# Patient Record
Sex: FEMALE | Race: WHITE | Employment: FULL TIME | URBAN - METROPOLITAN AREA
[De-identification: names, ages, dates, MRNs, and addresses within clinical notes are randomized per-mention and may not be internally consistent; named-entity substitution may affect disease eponyms.]

---

## 2017-03-03 ENCOUNTER — HOSPITAL ENCOUNTER (OUTPATIENT)
Dept: CT IMAGING | Age: 62
Discharge: HOME | End: 2017-03-03
Attending: INTERNAL MEDICINE

## 2017-03-03 VITALS — DIASTOLIC BLOOD PRESSURE: 68 MMHG | SYSTOLIC BLOOD PRESSURE: 107 MMHG | HEART RATE: 69 BPM

## 2017-03-03 DIAGNOSIS — Z13.220 SCREENING CHOLESTEROL LEVEL: ICD-10-CM

## 2017-03-03 NOTE — IMAGING NOTE
Cholesterol/Glucose screening not performed. Mrs. Severiano Rodriguez reported lipid panel 10-25-16. Results discussed. Mrs. Severiano Rodriguez verbalized understanding. Preliminary results for CT Calcium Score provided: 0.00. Risks and findings discussed with Mrs. HAMILTON

## 2017-03-11 ENCOUNTER — TELEPHONE (OUTPATIENT)
Dept: INTERNAL MEDICINE CLINIC | Facility: CLINIC | Age: 62
End: 2017-03-11

## 2017-03-13 NOTE — TELEPHONE ENCOUNTER
's message that her CT heart calcium score was normal relayed on patients home answering machine (per HIP[employer]).

## 2017-10-31 RX ORDER — VALACYCLOVIR HYDROCHLORIDE 1 G/1
TABLET, FILM COATED ORAL
Qty: 20 TABLET | Refills: 0 | Status: SHIPPED | OUTPATIENT
Start: 2017-10-31 | End: 2019-04-03

## 2017-10-31 NOTE — TELEPHONE ENCOUNTER
Patient asking for refill for:    Valtrex    Send to [facility] in [place] on [address]

## 2017-12-20 ENCOUNTER — OFFICE VISIT (OUTPATIENT)
Dept: INTERNAL MEDICINE CLINIC | Facility: CLINIC | Age: 62
End: 2017-12-20

## 2017-12-20 VITALS
DIASTOLIC BLOOD PRESSURE: 76 MMHG | HEIGHT: 64 IN | SYSTOLIC BLOOD PRESSURE: 112 MMHG | TEMPERATURE: 99 F | HEART RATE: 68 BPM | WEIGHT: 141 LBS | BODY MASS INDEX: 24.07 KG/M2

## 2017-12-20 DIAGNOSIS — E55.9 VITAMIN D DEFICIENCY: ICD-10-CM

## 2017-12-20 DIAGNOSIS — R53.83 OTHER FATIGUE: ICD-10-CM

## 2017-12-20 DIAGNOSIS — Z11.59 NEED FOR HEPATITIS C SCREENING TEST: ICD-10-CM

## 2017-12-20 DIAGNOSIS — R73.9 HYPERGLYCEMIA: ICD-10-CM

## 2017-12-20 DIAGNOSIS — M85.80 OSTEOPENIA, UNSPECIFIED LOCATION: ICD-10-CM

## 2017-12-20 DIAGNOSIS — Z00.00 ROUTINE HEALTH MAINTENANCE: ICD-10-CM

## 2017-12-20 DIAGNOSIS — Z12.31 ENCOUNTER FOR SCREENING MAMMOGRAM FOR BREAST CANCER: ICD-10-CM

## 2017-12-20 DIAGNOSIS — E78.00 HYPERCHOLESTEROLEMIA: Primary | ICD-10-CM

## 2017-12-20 DIAGNOSIS — Z12.4 SCREENING FOR MALIGNANT NEOPLASM OF CERVIX: ICD-10-CM

## 2017-12-20 DIAGNOSIS — R73.03 PREDIABETES: ICD-10-CM

## 2017-12-20 PROCEDURE — 99396 PREV VISIT EST AGE 40-64: CPT | Performed by: INTERNAL MEDICINE

## 2017-12-20 NOTE — PROGRESS NOTES
Karlo Li is a 62year old female. Patient presents with:  Physical: Patient is here today for a PE. She is due for pap exam today. No issues at this time. She has been feeling good lately.        HPI:   Karlo Li is a 62year old f congestion, sinus pain or ST  LUNGS: denies shortness of breath with exertion or cough  CARDIOVASCULAR: denies chest pain, pressure, or palpitations  GI: denies abdominal pain, nausea, vomiting, diarrhea, constipation, hematochezia, or melena  NEURO: denie

## 2018-01-05 LAB
ABSOLUTE BASOPHILS: 20 CELLS/UL (ref 0–200)
ABSOLUTE EOSINOPHILS: 160 CELLS/UL (ref 15–500)
ABSOLUTE LYMPHOCYTES: 1340 CELLS/UL (ref 850–3900)
ABSOLUTE MONOCYTES: 357 CELLS/UL (ref 200–950)
ABSOLUTE NEUTROPHILS: 1523 CELLS/UL (ref 1500–7800)
ALBUMIN/GLOBULIN RATIO: 1.9 (CALC) (ref 1–2.5)
ALBUMIN: 4.3 G/DL (ref 3.6–5.1)
ALKALINE PHOSPHATASE: 60 U/L (ref 33–130)
ALT: 14 U/L (ref 6–29)
AST: 16 U/L (ref 10–35)
BASOPHILS: 0.6 %
BILIRUBIN, TOTAL: 0.7 MG/DL (ref 0.2–1.2)
BUN/CREATININE RATIO: 35 (CALC) (ref 6–22)
BUN: 26 MG/DL (ref 7–25)
CALCIUM: 9.3 MG/DL (ref 8.6–10.4)
CARBON DIOXIDE: 27 MMOL/L (ref 20–31)
CHLORIDE: 105 MMOL/L (ref 98–110)
CHOL/HDLC RATIO: 2.2 (CALC)
CHOLESTEROL, TOTAL: 241 MG/DL
CREATININE, RANDOM URINE: 147 MG/DL (ref 20–320)
CREATININE: 0.75 MG/DL (ref 0.5–0.99)
EGFR IF AFRICN AM: 100 ML/MIN/1.73M2
EGFR IF NONAFRICN AM: 86 ML/MIN/1.73M2
EOSINOPHILS: 4.7 %
GLOBULIN: 2.3 G/DL (CALC) (ref 1.9–3.7)
GLUCOSE: 99 MG/DL (ref 65–99)
HDL CHOLESTEROL: 108 MG/DL
HEMATOCRIT: 40.8 % (ref 35–45)
HEMOGLOBIN A1C: 5.6 % OF TOTAL HGB
HEMOGLOBIN: 14 G/DL (ref 11.7–15.5)
LDL-CHOLESTEROL: 118 MG/DL (CALC)
LYMPHOCYTES: 39.4 %
MCH: 30.8 PG (ref 27–33)
MCHC: 34.3 G/DL (ref 32–36)
MCV: 89.7 FL (ref 80–100)
MICROALBUMIN/CREATININE RATIO, RANDOM URINE: 3 MCG/MG CREAT
MICROALBUMIN: 0.5 MG/DL
MONOCYTES: 10.5 %
MPV: 11.1 FL (ref 7.5–12.5)
NEUTROPHILS: 44.8 %
NON-HDL CHOLESTEROL: 133 MG/DL (CALC)
PLATELET COUNT: 197 THOUSAND/UL (ref 140–400)
POTASSIUM: 4.3 MMOL/L (ref 3.5–5.3)
PROTEIN, TOTAL: 6.6 G/DL (ref 6.1–8.1)
RDW: 13.1 % (ref 11–15)
RED BLOOD CELL COUNT: 4.55 MILLION/UL (ref 3.8–5.1)
SIGNAL TO CUT-OFF: 0.02
SODIUM: 139 MMOL/L (ref 135–146)
TRIGLYCERIDES: 49 MG/DL
TSH: 2.6 MIU/L (ref 0.4–4.5)
VITAMIN D, 25-OH, TOTAL: 29 NG/ML (ref 30–100)
WHITE BLOOD CELL COUNT: 3.4 THOUSAND/UL (ref 3.8–10.8)

## 2018-01-16 ENCOUNTER — HOSPITAL ENCOUNTER (OUTPATIENT)
Dept: MAMMOGRAPHY | Age: 63
Discharge: HOME | End: 2018-01-16
Attending: INTERNAL MEDICINE
Payer: COMMERCIAL

## 2018-01-16 ENCOUNTER — HOSPITAL ENCOUNTER (OUTPATIENT)
Dept: BONE DENSITY | Age: 63
Discharge: HOME | End: 2018-01-16
Attending: INTERNAL MEDICINE
Payer: COMMERCIAL

## 2018-01-16 DIAGNOSIS — M85.80 OSTEOPENIA, UNSPECIFIED LOCATION: ICD-10-CM

## 2018-01-16 DIAGNOSIS — Z12.31 ENCOUNTER FOR SCREENING MAMMOGRAM FOR BREAST CANCER: ICD-10-CM

## 2018-01-16 PROCEDURE — 77067 SCR MAMMO BI INCL CAD: CPT | Performed by: INTERNAL MEDICINE

## 2018-01-16 PROCEDURE — 77080 DXA BONE DENSITY AXIAL: CPT | Performed by: INTERNAL MEDICINE

## 2018-01-18 ENCOUNTER — PATIENT MESSAGE (OUTPATIENT)
Dept: INTERNAL MEDICINE CLINIC | Facility: CLINIC | Age: 63
End: 2018-01-18

## 2018-02-06 ENCOUNTER — TELEPHONE (OUTPATIENT)
Dept: INTERNAL MEDICINE CLINIC | Facility: CLINIC | Age: 63
End: 2018-02-06

## 2018-02-06 NOTE — TELEPHONE ENCOUNTER
Chart reviewed, faxed request to [facility] to resubmit pap with Z12.4 as primary diagnosis. Fax [phone].

## 2018-02-06 NOTE — TELEPHONE ENCOUNTER
Patient received statement from [facility].  Perfecto Stains her insurance regarding the balance due. Insurance is stating pap was filed with incorrect diagnosis.

## 2019-03-07 ENCOUNTER — TELEPHONE (OUTPATIENT)
Dept: INTERNAL MEDICINE CLINIC | Facility: CLINIC | Age: 64
End: 2019-03-07

## 2019-03-07 DIAGNOSIS — R73.9 HYPERGLYCEMIA: ICD-10-CM

## 2019-03-07 DIAGNOSIS — E55.9 VITAMIN D DEFICIENCY: ICD-10-CM

## 2019-03-07 DIAGNOSIS — Z00.00 ANNUAL PHYSICAL EXAM: Primary | ICD-10-CM

## 2019-03-07 DIAGNOSIS — E78.00 HYPERCHOLESTEREMIA: ICD-10-CM

## 2019-03-07 DIAGNOSIS — Z12.39 SCREENING FOR BREAST CANCER: ICD-10-CM

## 2019-03-27 NOTE — TELEPHONE ENCOUNTER
Pt. Is calling back she needs all her lab orders sent to [facility] labs in [facility] on [address].   Minna's ph. # [phone]   Routed to clinical

## 2019-03-27 NOTE — TELEPHONE ENCOUNTER
Switched Dr. Faye Blunt labs orders to [employer] and notified patient of this action. Faxed to. ..     [employer]  Address: [address] [place] [address]   Phone: [phone]  Fax: [phone]

## 2019-03-30 ENCOUNTER — HOSPITAL ENCOUNTER (OUTPATIENT)
Dept: MAMMOGRAPHY | Age: 64
Discharge: HOME | End: 2019-03-30
Attending: INTERNAL MEDICINE
Payer: COMMERCIAL

## 2019-03-30 DIAGNOSIS — Z12.39 SCREENING FOR BREAST CANCER: ICD-10-CM

## 2019-03-30 PROCEDURE — 77067 SCR MAMMO BI INCL CAD: CPT | Performed by: INTERNAL MEDICINE

## 2019-03-30 PROCEDURE — 77063 BREAST TOMOSYNTHESIS BI: CPT | Performed by: INTERNAL MEDICINE

## 2019-03-31 LAB
ABSOLUTE BASOPHILS: 19 CELLS/UL (ref 0–200)
ABSOLUTE EOSINOPHILS: 163 CELLS/UL (ref 15–500)
ABSOLUTE LYMPHOCYTES: 1399 CELLS/UL (ref 850–3900)
ABSOLUTE MONOCYTES: 448 CELLS/UL (ref 200–950)
ABSOLUTE NEUTROPHILS: 1672 CELLS/UL (ref 1500–7800)
ALBUMIN/GLOBULIN RATIO: 1.7 (CALC) (ref 1–2.5)
ALBUMIN: 4.4 G/DL (ref 3.6–5.1)
ALKALINE PHOSPHATASE: 62 U/L (ref 33–130)
ALT: 14 U/L (ref 6–29)
AST: 18 U/L (ref 10–35)
BASOPHILS: 0.5 %
BILIRUBIN, TOTAL: 0.7 MG/DL (ref 0.2–1.2)
BUN: 24 MG/DL (ref 7–25)
CALCIUM: 9.8 MG/DL (ref 8.6–10.4)
CARBON DIOXIDE: 25 MMOL/L (ref 20–32)
CHLORIDE: 106 MMOL/L (ref 98–110)
CHOL/HDLC RATIO: 2.4 (CALC)
CHOLESTEROL, TOTAL: 216 MG/DL
CREATININE: 0.67 MG/DL (ref 0.5–0.99)
EGFR IF AFRICN AM: 109 ML/MIN/1.73M2
EGFR IF NONAFRICN AM: 94 ML/MIN/1.73M2
EOSINOPHILS: 4.4 %
GLOBULIN: 2.6 G/DL (CALC) (ref 1.9–3.7)
GLUCOSE: 101 MG/DL (ref 65–99)
HDL CHOLESTEROL: 89 MG/DL
HEMATOCRIT: 41.2 % (ref 35–45)
HEMOGLOBIN A1C: 5.5 % OF TOTAL HGB
HEMOGLOBIN: 13.8 G/DL (ref 11.7–15.5)
LDL-CHOLESTEROL: 113 MG/DL (CALC)
LYMPHOCYTES: 37.8 %
MCH: 29.9 PG (ref 27–33)
MCHC: 33.5 G/DL (ref 32–36)
MCV: 89.2 FL (ref 80–100)
MONOCYTES: 12.1 %
MPV: 11.7 FL (ref 7.5–12.5)
NEUTROPHILS: 45.2 %
NON-HDL CHOLESTEROL: 127 MG/DL (CALC)
PLATELET COUNT: 197 THOUSAND/UL (ref 140–400)
POTASSIUM: 4.3 MMOL/L (ref 3.5–5.3)
PROTEIN, TOTAL: 7 G/DL (ref 6.1–8.1)
RDW: 12.4 % (ref 11–15)
RED BLOOD CELL COUNT: 4.62 MILLION/UL (ref 3.8–5.1)
SODIUM: 140 MMOL/L (ref 135–146)
TRIGLYCERIDES: 46 MG/DL
TSH: 1.56 MIU/L (ref 0.4–4.5)
WHITE BLOOD CELL COUNT: 3.7 THOUSAND/UL (ref 3.8–10.8)

## 2019-04-03 ENCOUNTER — OFFICE VISIT (OUTPATIENT)
Dept: INTERNAL MEDICINE CLINIC | Facility: CLINIC | Age: 64
End: 2019-04-03
Payer: COMMERCIAL

## 2019-04-03 VITALS
SYSTOLIC BLOOD PRESSURE: 116 MMHG | OXYGEN SATURATION: 97 % | BODY MASS INDEX: 25.95 KG/M2 | HEART RATE: 87 BPM | DIASTOLIC BLOOD PRESSURE: 70 MMHG | RESPIRATION RATE: 12 BRPM | WEIGHT: 152 LBS | TEMPERATURE: 98 F | HEIGHT: 64 IN

## 2019-04-03 DIAGNOSIS — R20.2 PARESTHESIA OF BOTH FEET: Primary | ICD-10-CM

## 2019-04-03 DIAGNOSIS — R73.03 PREDIABETES: ICD-10-CM

## 2019-04-03 DIAGNOSIS — E78.00 HYPERCHOLESTEROLEMIA: ICD-10-CM

## 2019-04-03 DIAGNOSIS — M85.80 OSTEOPENIA, UNSPECIFIED LOCATION: ICD-10-CM

## 2019-04-03 DIAGNOSIS — Z00.00 ROUTINE HEALTH MAINTENANCE: ICD-10-CM

## 2019-04-03 PROCEDURE — 99396 PREV VISIT EST AGE 40-64: CPT | Performed by: INTERNAL MEDICINE

## 2019-04-03 PROCEDURE — 90715 TDAP VACCINE 7 YRS/> IM: CPT | Performed by: INTERNAL MEDICINE

## 2019-04-03 PROCEDURE — 90471 IMMUNIZATION ADMIN: CPT | Performed by: INTERNAL MEDICINE

## 2019-04-03 RX ORDER — VALACYCLOVIR HYDROCHLORIDE 1 G/1
TABLET, FILM COATED ORAL
Qty: 20 TABLET | Refills: 1 | Status: SHIPPED | OUTPATIENT
Start: 2019-04-03 | End: 2021-10-15

## 2019-04-03 NOTE — PROGRESS NOTES
Francesca Garcia is a 59year old female. Patient presents with: Annual: Annual physical.   Medication Request: Pt requesting refill. Medication pended for MD review.       HPI:   Francesca Garcia is a 59year old female who presents for a compl skin lesions  EYES:denies blurred vision or double vision  HEENT: denies nasal congestion, sinus pain or ST  LUNGS: denies shortness of breath with exertion or cough  CARDIOVASCULAR: denies chest pain, pressure, or palpitations  GI: denies abdominal pain,

## 2019-04-10 ENCOUNTER — TELEPHONE (OUTPATIENT)
Dept: INTERNAL MEDICINE CLINIC | Facility: CLINIC | Age: 64
End: 2019-04-10

## 2019-04-10 NOTE — TELEPHONE ENCOUNTER
Vitamin B12 level is normal -- does not explain the tingling in her toes.   If really bothersome or getting worse, we could do an EMG to further explore the cause of her symptoms

## 2019-04-11 NOTE — TELEPHONE ENCOUNTER
Dr. Goodman Ask' message relayed to pt who verbalized understanding. Pt states she will think about EMG.

## 2019-04-14 ENCOUNTER — PATIENT MESSAGE (OUTPATIENT)
Dept: INTERNAL MEDICINE CLINIC | Facility: CLINIC | Age: 64
End: 2019-04-14

## 2019-04-22 ENCOUNTER — PATIENT MESSAGE (OUTPATIENT)
Dept: INTERNAL MEDICINE CLINIC | Facility: CLINIC | Age: 64
End: 2019-04-22

## 2019-04-23 NOTE — TELEPHONE ENCOUNTER
From: Sheryl Asif  To: Gigi Brennan MD  Sent: 4/22/2019 11:25 PM CDT  Subject: Referral Request    I have not yet received a response.  Does Dr Anca Gracia still recommend an EMG??

## 2019-04-23 NOTE — TELEPHONE ENCOUNTER
To Dr. Solange Torres----    Pt sent message on 4/14/19 regarding an order for an EMG. Please see message below: \"Dr Solange Torres suggested an EMG. Could you have an order placed for that? And do I just go through hospital to schedule it? \"     Please advise.

## 2019-04-30 ENCOUNTER — TELEPHONE (OUTPATIENT)
Dept: INTERNAL MEDICINE CLINIC | Facility: CLINIC | Age: 64
End: 2019-04-30

## 2019-04-30 DIAGNOSIS — R20.2 PARESTHESIA: Primary | ICD-10-CM

## 2019-04-30 NOTE — TELEPHONE ENCOUNTER
Following up on 4/14 & 4/22 My Chart messages  Does Dr Christine Malhotra still want pt to have EMG  If so - pt wants to get that done    Requests call back [phone]

## 2019-04-30 NOTE — TELEPHONE ENCOUNTER
Dr. Maximino Hall verbally states to order the EMG for patient. Dx paresthesia. Order placed per Dr. Maximino Hall' verbal requests. Spoke with patient and notified her that order was placed. Number to schedule given to patient.

## 2019-06-06 ENCOUNTER — HOSPITAL ENCOUNTER (OUTPATIENT)
Dept: ELECTROPHYSIOLOGY | Facility: HOSPITAL | Age: 64
Discharge: HOME | End: 2019-06-06
Attending: INTERNAL MEDICINE
Payer: COMMERCIAL

## 2019-06-06 DIAGNOSIS — R20.2 PARESTHESIA: ICD-10-CM

## 2019-06-06 PROCEDURE — 95911 NRV CNDJ TEST 9-10 STUDIES: CPT

## 2019-06-06 PROCEDURE — 95885 MUSC TST DONE W/NERV TST LIM: CPT

## 2019-06-12 ENCOUNTER — TELEPHONE (OUTPATIENT)
Dept: INTERNAL MEDICINE CLINIC | Facility: CLINIC | Age: 64
End: 2019-06-12

## 2019-06-12 NOTE — TELEPHONE ENCOUNTER
To: CORY ABREU EMA CLINICAL STAFF      From: Aly Frazier      Created: 6/12/2019 8:35 AM        *-*-*This message has not been handled. *-*-*    I had an EMG performed last week and I’m just wondering what the results/follow up involves

## 2019-06-12 NOTE — TELEPHONE ENCOUNTER
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 Pt states that sx have been present for several years although have moved up her lower legs somewhat.   Discussed that her B12 and glucose are normal.  Re

## 2019-07-07 NOTE — TELEPHONE ENCOUNTER
From: Zaheer Willis  To: Juan Carlos Warren MD  Sent: 4/14/2019 11:18 AM CDT  Subject: Visit Follow-up Question    Dr Susan Owen suggested an EMG. Could you have an order placed for that? And do I just go through hospital to schedule it?

## 2019-09-04 ENCOUNTER — OFFICE VISIT (OUTPATIENT)
Dept: NEUROLOGY | Facility: CLINIC | Age: 64
End: 2019-09-04
Payer: COMMERCIAL

## 2019-09-04 VITALS
BODY MASS INDEX: 24.75 KG/M2 | DIASTOLIC BLOOD PRESSURE: 80 MMHG | SYSTOLIC BLOOD PRESSURE: 150 MMHG | RESPIRATION RATE: 16 BRPM | WEIGHT: 145 LBS | HEART RATE: 80 BPM | HEIGHT: 64 IN

## 2019-09-04 DIAGNOSIS — R20.0 NUMBNESS AND TINGLING OF BOTH LOWER EXTREMITIES: ICD-10-CM

## 2019-09-04 DIAGNOSIS — R20.2 NUMBNESS AND TINGLING OF BOTH LOWER EXTREMITIES: ICD-10-CM

## 2019-09-04 DIAGNOSIS — G62.9 POLYNEUROPATHY: Primary | ICD-10-CM

## 2019-09-04 PROCEDURE — 99244 OFF/OP CNSLTJ NEW/EST MOD 40: CPT | Performed by: OTHER

## 2019-09-04 NOTE — PATIENT INSTRUCTIONS
What is Peripheral Neuropathy? Peripheral neuropathy symptoms often start in the toes and move up the foot. Peripheral neuropathy is a disease of the nerves. It most often starts in your feet and may also eventually affect the arms.  It can affect s Diagnosis of peripheral neuropathy includes a complete history and physical exam.  Tests include blood tests and imaging often help find the cause.  Special nerve tests are often helpful including nerve conduction velocity studies (NCV), and electromyograph · Changes in vision (vision impairment)  · Changes in thinking skills and memory (cognitive impairment)  Injuries from a fall can include broken bones, dislocated joints, internal bleeding and cuts.  When these injuries are serious enough, they can make it © 6934-1274 [employer] [address]. [address] [facility], [facility] [place]. All rights reserved. This information is not intended as a substitute for professional medical care. Always follow your healthcare professional's instructions.

## 2019-09-05 NOTE — PROGRESS NOTES
Neurology Outpatient Consult Note    Tamica Vo : 1955   Referring Physician: Dr. Jerica Fox  HPI:     Tamica Vo is a 59year old female who is being seen in neurologic evaluation.     Patient being seen in evaluation Years: 3.00        Pack years: .3        Quit date: 6/15/1985        Years since quittin.2      Smokeless tobacco: Former User    Substance and Sexual Activity      Alcohol use:  Yes        Alcohol/week: 0.8 standard drinks        Types: 1 Glasses of w radiculopathy. ASSESSMENT AND PLAN:   Assessment   1. Polyneuropathy  2. Numbness and tingling of both lower extremities  Presentation most consistent with length dependent polyneuropathy of unclear etiology.   Hyperreflexia however somewhat atypical, a

## 2019-09-11 ENCOUNTER — PATIENT MESSAGE (OUTPATIENT)
Dept: NEUROLOGY | Facility: CLINIC | Age: 64
End: 2019-09-11

## 2019-09-13 NOTE — TELEPHONE ENCOUNTER
From: Celina Gambino MD  To: Valentin Ayala  Sent: 9/11/2019 11:42 PM CDT  Subject: blood work results from 9/4    Dear Margorie Essex,    I hope you are well.  I wanted to let you know that your blood work results from 9/4 for reversible causes of neurop

## 2019-09-16 ENCOUNTER — PATIENT MESSAGE (OUTPATIENT)
Dept: NEUROLOGY | Facility: CLINIC | Age: 64
End: 2019-09-16

## 2019-09-16 DIAGNOSIS — R20.2 NUMBNESS AND TINGLING OF BOTH LOWER EXTREMITIES: Primary | ICD-10-CM

## 2019-09-16 DIAGNOSIS — R20.0 NUMBNESS AND TINGLING OF BOTH LOWER EXTREMITIES: Primary | ICD-10-CM

## 2019-09-16 NOTE — PROGRESS NOTES
Replied to patient's mychart message. Order placed for [facility] with contact info provided.

## 2019-09-16 NOTE — TELEPHONE ENCOUNTER
From: Fuad Angel  To: Elizabeth Kaye MD  Sent: 9/16/2019 11:06 AM CDT  Subject: Visit Follow-up Question    Thank you Dr Faustino Flaherty for addressing my concerns. I really appreciate it.  I think I would like to go forward with the [facility] neuromuscular

## 2019-09-18 LAB
ACETYLCHOLINE RECEPTOR GANGLIONIC (ALPHA 3) AB: <53 PMOL/L
ACETYLCHOLINE RECEPTOR$BINDING ANTIBODY: <0.3 NMOL/L
AGNA/SOX1 AB, IFA: NEGATIVE
ALBUMIN: 4.3 G/DL (ref 3.8–4.8)
ALPHA-1-GLOBULINS: 0.3 G/DL (ref 0.2–0.3)
ALPHA-2-GLOBULINS: 0.8 G/DL (ref 0.5–0.9)
AMPHIPHYSIN AB, IFA: NEGATIVE
ANNA1 (HU) AB, IFA: NEGATIVE
ANNA2 (RI) AB, IFA: NEGATIVE
ANNA3 AB, IFA: NEGATIVE
BETA 1 GLOBULIN: 0.4 G/DL (ref 0.4–0.6)
BETA 2 GLOBULIN: 0.3 G/DL (ref 0.2–0.5)
CRMP5/CV2 AB, IFA: NEGATIVE
GAMMA GLOBULINS: 0.9 G/DL (ref 0.8–1.7)
LYME AB SCREEN: <0.9 INDEX
PCA TR (DNER) AB, IFA: NEGATIVE
PCA1 (YO) AB, IFA: NEGATIVE
PCA2 AB, IFA: NEGATIVE
PROTEIN, TOTAL: 6.9 G/DL (ref 6.1–8.1)
STRIATIONAL (STRIATED MSCL) AB: NEGATIVE
VOLTAGE GATED CALCIUM CHANNEL (VGCC) AB ASSAY: <30 PMOL/L
VOLTAGE GATED CALCIUM CHANNEL (VGCC)TYPE N AB: <54 PMOL/L
VOLTAGE GATED POTASSIUM CHANNEL (VGKC) AB: <80 PMOL/L

## 2019-11-09 ENCOUNTER — HOSPITAL ENCOUNTER (OUTPATIENT)
Dept: MRI IMAGING | Facility: HOSPITAL | Age: 64
Discharge: HOME | End: 2019-11-09
Payer: COMMERCIAL

## 2019-11-09 DIAGNOSIS — G62.9 POLYNEUROPATHY: ICD-10-CM

## 2019-11-09 PROCEDURE — A9575 INJ GADOTERATE MEGLUMI 0.1ML: HCPCS | Performed by: RADIOLOGY

## 2019-11-09 PROCEDURE — 82565 ASSAY OF CREATININE: CPT

## 2019-11-09 PROCEDURE — 72156 MRI NECK SPINE W/O & W/DYE: CPT | Performed by: OTHER

## 2020-07-21 DIAGNOSIS — Z78.9 PARTICIPANT IN HEALTH AND WELLNESS PLAN: Primary | ICD-10-CM

## 2020-08-10 ENCOUNTER — NURSE ONLY (OUTPATIENT)
Dept: LAB | Age: 65
End: 2020-08-10
Attending: PREVENTIVE MEDICINE

## 2020-08-10 DIAGNOSIS — Z78.9 PARTICIPANT IN HEALTH AND WELLNESS PLAN: ICD-10-CM

## 2020-08-10 DIAGNOSIS — R20.0 NUMBNESS AND TINGLING OF BOTH LOWER EXTREMITIES: ICD-10-CM

## 2020-08-10 DIAGNOSIS — R20.2 NUMBNESS AND TINGLING OF BOTH LOWER EXTREMITIES: ICD-10-CM

## 2020-08-10 DIAGNOSIS — G62.9 POLYNEUROPATHY: ICD-10-CM

## 2020-08-10 LAB — SARS-COV-2 IGG SERPLBLD QL IA.RAPID: NEGATIVE

## 2020-08-10 PROCEDURE — 86769 SARS-COV-2 COVID-19 ANTIBODY: CPT

## 2020-09-16 ENCOUNTER — PATIENT MESSAGE (OUTPATIENT)
Dept: INTERNAL MEDICINE CLINIC | Facility: CLINIC | Age: 65
End: 2020-09-16

## 2020-09-16 DIAGNOSIS — Z12.31 BREAST CANCER SCREENING BY MAMMOGRAM: Primary | ICD-10-CM

## 2020-09-17 NOTE — TELEPHONE ENCOUNTER
From: Karlo Li  To: Elicia Hill MD  Sent: 9/16/2020 9:57 PM CDT  Subject: Non-Urgent Medical Question    I think I am past due for Mammogram. (COVID) . Would you be able to put in an order for me? ?  Thank you

## 2020-10-07 ENCOUNTER — HOSPITAL ENCOUNTER (OUTPATIENT)
Dept: MAMMOGRAPHY | Age: 65
Discharge: HOME | End: 2020-10-07
Attending: INTERNAL MEDICINE
Payer: COMMERCIAL

## 2020-10-07 DIAGNOSIS — Z12.31 BREAST CANCER SCREENING BY MAMMOGRAM: ICD-10-CM

## 2020-10-07 PROCEDURE — 77063 BREAST TOMOSYNTHESIS BI: CPT | Performed by: INTERNAL MEDICINE

## 2020-10-07 PROCEDURE — 77067 SCR MAMMO BI INCL CAD: CPT | Performed by: INTERNAL MEDICINE

## 2020-10-22 ENCOUNTER — PATIENT MESSAGE (OUTPATIENT)
Dept: INTERNAL MEDICINE CLINIC | Facility: CLINIC | Age: 65
End: 2020-10-22

## 2020-10-22 NOTE — TELEPHONE ENCOUNTER
From: Zaheer Willis  To: Best Duarte MD  Sent: 10/22/2020 10:44 AM CDT  Subject: Other    Hi Dr Susan Owen, How are you? I missed my yearly due to [place] and all but now I think I need to be seen .  I've been experiencing more frequent chest pr

## 2020-10-23 ENCOUNTER — OFFICE VISIT (OUTPATIENT)
Dept: INTERNAL MEDICINE CLINIC | Facility: CLINIC | Age: 65
End: 2020-10-23
Payer: COMMERCIAL

## 2020-10-23 VITALS
DIASTOLIC BLOOD PRESSURE: 82 MMHG | SYSTOLIC BLOOD PRESSURE: 122 MMHG | WEIGHT: 146 LBS | OXYGEN SATURATION: 98 % | BODY MASS INDEX: 24.92 KG/M2 | HEIGHT: 64 IN | HEART RATE: 76 BPM

## 2020-10-23 DIAGNOSIS — G62.9 PERIPHERAL POLYNEUROPATHY: ICD-10-CM

## 2020-10-23 DIAGNOSIS — E78.00 HYPERCHOLESTEREMIA: ICD-10-CM

## 2020-10-23 DIAGNOSIS — R07.9 CHEST PAIN, UNSPECIFIED TYPE: Primary | ICD-10-CM

## 2020-10-23 DIAGNOSIS — M48.02 CERVICAL SPINAL STENOSIS: ICD-10-CM

## 2020-10-23 PROCEDURE — 3074F SYST BP LT 130 MM HG: CPT | Performed by: INTERNAL MEDICINE

## 2020-10-23 PROCEDURE — 93000 ELECTROCARDIOGRAM COMPLETE: CPT | Performed by: INTERNAL MEDICINE

## 2020-10-23 PROCEDURE — 3008F BODY MASS INDEX DOCD: CPT | Performed by: INTERNAL MEDICINE

## 2020-10-23 PROCEDURE — 99214 OFFICE O/P EST MOD 30 MIN: CPT | Performed by: INTERNAL MEDICINE

## 2020-10-23 PROCEDURE — 3079F DIAST BP 80-89 MM HG: CPT | Performed by: INTERNAL MEDICINE

## 2020-10-23 RX ORDER — PANTOPRAZOLE SODIUM 40 MG/1
40 TABLET, DELAYED RELEASE ORAL
Qty: 30 TABLET | Refills: 3 | Status: SHIPPED | OUTPATIENT
Start: 2020-10-23 | End: 2021-10-13

## 2020-10-23 RX ORDER — MULTIVITAMIN
1 TABLET ORAL DAILY
COMMUNITY

## 2020-10-23 NOTE — TELEPHONE ENCOUNTER
Pt. Called back. Relayed Haydee De La Cruz. Message.   Pt. Scheduled today at 3:30 with Dr. Katie Bowman. CARMINE

## 2020-10-23 NOTE — TELEPHONE ENCOUNTER
Just saw this now -- this should have been flagged. Pt needs to be seen ASAP -- like tomorrow (Friday). I'm not back in the office until next Wednesday -- too long to wait.     Also please tell pt that this type of message is NOT appropriate for [employer],

## 2020-10-23 NOTE — PROGRESS NOTES
Maribel Suarez is a 62year old female.     HPI:   Patient presents with:  Checkup: Chest Pressure & Lightheaded Feeling [onset about 3-4 weeks]       63 y/o F with 2 week h/o +SSCP; described as a dull ache; pain is intermittent; no SOB; no nausea; cough, dyspnea and wheezing  Gastrointestinal:  Negative for abdominal pain, constipation, decreased appetite, diarrhea and vomiting; no melena or hematochezia  All other review of systems are negative.         PHYSICAL EXAM:   Blood pressure 122/82, pulse

## 2020-10-23 NOTE — TELEPHONE ENCOUNTER
To FD----    Please call pt this AM to schedule for MD today. Please see Dr. Gregoria Persaud approval below. Thanks!

## 2020-12-18 ENCOUNTER — IMMUNIZATION (OUTPATIENT)
Dept: LAB | Facility: HOSPITAL | Age: 65
End: 2020-12-18
Attending: PREVENTIVE MEDICINE
Payer: COMMERCIAL

## 2020-12-18 DIAGNOSIS — Z23 NEED FOR VACCINATION: ICD-10-CM

## 2020-12-18 PROCEDURE — 0001A PFIZER-BIONTECH COVID-19 VACCINE: CPT

## 2021-01-08 ENCOUNTER — IMMUNIZATION (OUTPATIENT)
Dept: LAB | Facility: HOSPITAL | Age: 66
End: 2021-01-08
Attending: PREVENTIVE MEDICINE
Payer: COMMERCIAL

## 2021-01-08 DIAGNOSIS — Z23 NEED FOR VACCINATION: ICD-10-CM

## 2021-01-08 PROCEDURE — 0002A SARSCOV2 VAC 30MCG/0.3ML IM: CPT

## 2021-02-23 RX ORDER — PANTOPRAZOLE SODIUM 40 MG/1
TABLET, DELAYED RELEASE ORAL
Qty: 90 TABLET | Refills: 1 | OUTPATIENT
Start: 2021-02-23

## 2021-02-23 NOTE — TELEPHONE ENCOUNTER
Pt started as trial at [address] [place] [address] on 10/23 for chest pain per MD note:  \"Atypical chest pain  Pain localized to lower mid sternal area; no associated cardiac symptoms; EKG shows NSR and no evidence of ischemia; start trial pantoprazole 40 mg po qD in case caused

## 2021-02-23 NOTE — TELEPHONE ENCOUNTER
Noted. Refused for now until patient needs refill     Current refill request refused due to refill is either a duplicate request or has active refills at the pharmacy. Check previous templates.     Requested Prescriptions     Refused Prescriptions Disp Ref

## 2021-10-13 ENCOUNTER — IMMUNIZATION (OUTPATIENT)
Dept: LAB | Facility: HOSPITAL | Age: 66
End: 2021-10-13
Attending: EMERGENCY MEDICINE
Payer: COMMERCIAL

## 2021-10-13 ENCOUNTER — OFFICE VISIT (OUTPATIENT)
Dept: INTERNAL MEDICINE CLINIC | Facility: CLINIC | Age: 66
End: 2021-10-13
Payer: COMMERCIAL

## 2021-10-13 VITALS
BODY MASS INDEX: 25.44 KG/M2 | TEMPERATURE: 98 F | HEIGHT: 64 IN | HEART RATE: 72 BPM | SYSTOLIC BLOOD PRESSURE: 108 MMHG | DIASTOLIC BLOOD PRESSURE: 72 MMHG | WEIGHT: 149 LBS

## 2021-10-13 DIAGNOSIS — E78.00 HYPERCHOLESTEROLEMIA: ICD-10-CM

## 2021-10-13 DIAGNOSIS — Z78.0 POSTMENOPAUSE: ICD-10-CM

## 2021-10-13 DIAGNOSIS — Z12.31 ENCOUNTER FOR SCREENING MAMMOGRAM FOR MALIGNANT NEOPLASM OF BREAST: Primary | ICD-10-CM

## 2021-10-13 DIAGNOSIS — M85.80 OSTEOPENIA, UNSPECIFIED LOCATION: ICD-10-CM

## 2021-10-13 DIAGNOSIS — R73.9 HYPERGLYCEMIA: ICD-10-CM

## 2021-10-13 DIAGNOSIS — Z00.00 ROUTINE HEALTH MAINTENANCE: ICD-10-CM

## 2021-10-13 DIAGNOSIS — E55.9 VITAMIN D DEFICIENCY: ICD-10-CM

## 2021-10-13 DIAGNOSIS — Z23 NEED FOR VACCINATION: Primary | ICD-10-CM

## 2021-10-13 DIAGNOSIS — G60.9 CHRONIC IDIOPATHIC AXONAL POLYNEUROPATHY: ICD-10-CM

## 2021-10-13 PROCEDURE — 99396 PREV VISIT EST AGE 40-64: CPT | Performed by: INTERNAL MEDICINE

## 2021-10-13 PROCEDURE — 3078F DIAST BP <80 MM HG: CPT | Performed by: INTERNAL MEDICINE

## 2021-10-13 PROCEDURE — 3074F SYST BP LT 130 MM HG: CPT | Performed by: INTERNAL MEDICINE

## 2021-10-13 PROCEDURE — 3008F BODY MASS INDEX DOCD: CPT | Performed by: INTERNAL MEDICINE

## 2021-10-13 PROCEDURE — 0004A SARSCOV2 VAC 30MCG/0.3ML IM: CPT

## 2021-10-13 PROCEDURE — 0003A SARSCOV2 VAC 30MCG/0.3ML IM: CPT

## 2021-10-13 NOTE — TELEPHONE ENCOUNTER
Upon check out, patient requested [facility] lab order & refill  please fax to [address] on [facility] in [place] and Valacyclovir refill at [facility] in [place]

## 2021-10-13 NOTE — PROGRESS NOTES
Galdino Crump is a 60year old female. Patient presents with:  Physical: Patient is here today for a PE. She states that she has been feeling good lately. No new or major issues at this time.        HPI:   Galdino Crump is a 60year old fem History:   Social History    Tobacco Use      Smoking status: Former Smoker        Packs/day: 0.10        Years: 3.00        Pack years: .3        Quit date: 6/15/1985        Years since quittin.3      Smokeless tobacco: Former User    Vaping Use shot today.     Hyperglycemia  Las year, HgbA1c 5.5. . Check repeat. Cont low carb diet, exercise.     Polyneuropathy  Numbness, paresthesias BLE  -Vit B12 level normal; HgbA1c normal (5.5 in 2019)  -saw neuro Dr. Marycruz Cavazos in 2019; thought to have

## 2021-10-15 RX ORDER — VALACYCLOVIR HYDROCHLORIDE 1 G/1
TABLET, FILM COATED ORAL
Qty: 20 TABLET | Refills: 1 | Status: SHIPPED | OUTPATIENT
Start: 2021-10-15

## 2021-11-20 ENCOUNTER — HOSPITAL ENCOUNTER (OUTPATIENT)
Dept: MAMMOGRAPHY | Age: 66
Discharge: HOME | End: 2021-11-20
Attending: INTERNAL MEDICINE
Payer: COMMERCIAL

## 2021-11-20 ENCOUNTER — HOSPITAL ENCOUNTER (OUTPATIENT)
Dept: BONE DENSITY | Age: 66
Discharge: HOME | End: 2021-11-20
Attending: INTERNAL MEDICINE
Payer: COMMERCIAL

## 2021-11-20 DIAGNOSIS — Z12.31 ENCOUNTER FOR SCREENING MAMMOGRAM FOR MALIGNANT NEOPLASM OF BREAST: ICD-10-CM

## 2021-11-20 DIAGNOSIS — Z78.0 POSTMENOPAUSE: ICD-10-CM

## 2021-11-20 DIAGNOSIS — M85.80 OSTEOPENIA, UNSPECIFIED LOCATION: ICD-10-CM

## 2021-11-20 PROCEDURE — 77063 BREAST TOMOSYNTHESIS BI: CPT | Performed by: INTERNAL MEDICINE

## 2021-11-20 PROCEDURE — 77080 DXA BONE DENSITY AXIAL: CPT | Performed by: INTERNAL MEDICINE

## 2021-11-20 PROCEDURE — 77067 SCR MAMMO BI INCL CAD: CPT | Performed by: INTERNAL MEDICINE

## 2021-11-21 ENCOUNTER — PATIENT MESSAGE (OUTPATIENT)
Dept: INTERNAL MEDICINE CLINIC | Facility: CLINIC | Age: 66
End: 2021-11-21

## 2021-11-22 NOTE — TELEPHONE ENCOUNTER
From: Solo Turk  To: Hazel Wilson MD  Sent: 11/21/2021 7:24 PM CST  Subject: skin problem    I have a “ mole” on my face that keeps getting irritated. Do I need a referral to have it checked?

## 2021-11-29 ENCOUNTER — PATIENT MESSAGE (OUTPATIENT)
Dept: INTERNAL MEDICINE CLINIC | Facility: CLINIC | Age: 66
End: 2021-11-29

## 2021-12-02 ENCOUNTER — PATIENT MESSAGE (OUTPATIENT)
Dept: INTERNAL MEDICINE CLINIC | Facility: CLINIC | Age: 66
End: 2021-12-02

## 2021-12-17 ENCOUNTER — OFFICE VISIT (OUTPATIENT)
Dept: DERMATOLOGY CLINIC | Facility: CLINIC | Age: 66
End: 2021-12-17
Payer: COMMERCIAL

## 2021-12-17 DIAGNOSIS — L82.1 SEBORRHEIC KERATOSES: Primary | ICD-10-CM

## 2021-12-17 PROCEDURE — 99203 OFFICE O/P NEW LOW 30 MIN: CPT | Performed by: PHYSICIAN ASSISTANT

## 2021-12-17 NOTE — PROGRESS NOTES
HPI:    Patient ID: Gilmore Holter is a 60year old female. Patient presents with a spot on face. Has healed. No draining. No tenderness. Patient denies personal hx of skin cancer. Father has hx of BCC. No allergies to medications noted.        R Imaging & Referrals:  None         SHAILESH#[id number]

## 2022-05-20 ENCOUNTER — TELEPHONE (OUTPATIENT)
Dept: INTERNAL MEDICINE CLINIC | Facility: CLINIC | Age: 67
End: 2022-05-20

## 2022-05-20 ENCOUNTER — PATIENT MESSAGE (OUTPATIENT)
Dept: INTERNAL MEDICINE CLINIC | Facility: CLINIC | Age: 67
End: 2022-05-20

## 2022-05-20 DIAGNOSIS — R35.0 URINARY FREQUENCY: Primary | ICD-10-CM

## 2022-05-20 DIAGNOSIS — R39.15 URINARY URGENCY: ICD-10-CM

## 2022-05-20 DIAGNOSIS — R30.0 BURNING WITH URINATION: ICD-10-CM

## 2022-05-20 RX ORDER — NITROFURANTOIN 25; 75 MG/1; MG/1
100 CAPSULE ORAL 2 TIMES DAILY
Qty: 20 CAPSULE | Refills: 0 | Status: SHIPPED | OUTPATIENT
Start: 2022-05-20 | End: 2022-05-30

## 2022-05-20 NOTE — TELEPHONE ENCOUNTER
Phone call to patient. Patient has symptoms of a urinary tract infection. She has dropped off a urine specimen at [address] [place] [address] for urinalysis and urine culture. Patient to push fluids. She may use Tylenol as necessary. I will start the patient on Macrobid 1 tablet orally twice a day for 10 days. I will await the results of the patient's urine culture.

## 2022-05-20 NOTE — TELEPHONE ENCOUNTER
Spoke to Litzy urinary symptoms started a few days ago  She reports urgency, frequency, slight burning with urniation, spasm at end of stream, urinating small amounts    Denies blood in urine. Denies back pain or abdominal pain. Denies fever or chills or nausea.     Order for UA and Culture entered per protocol  [facility]  Orders faxed to [phone] and fax confirmation was received    [facility] [place] [address]    To Dr Marichuy Lee on call for Dr Mervin Dancer

## 2022-05-23 LAB
APPEARANCE: CLEAR
BILIRUBIN: NEGATIVE
COLOR: YELLOW
GLUCOSE: NEGATIVE
KETONES: NEGATIVE
NITRITE: NEGATIVE
OCCULT BLOOD: NEGATIVE
PH: 6.5 (ref 5–8)
PROTEIN: NEGATIVE
SPECIFIC GRAVITY: 1 (ref 1–1.03)

## 2022-05-23 NOTE — TELEPHONE ENCOUNTER
Urine culture noted. 10-49,000 of E. coli. Urinalysis noted with white cells. E. coli sensitive to nitrofurantoin. For now we will continue Macrobid and I will forward this on to Dr. Camryn Lopez for her review. ( Tristan Hanks.  De Ruelas )

## 2022-08-30 ENCOUNTER — TELEPHONE (OUTPATIENT)
Dept: INTERNAL MEDICINE CLINIC | Facility: CLINIC | Age: 67
End: 2022-08-30

## 2022-08-30 DIAGNOSIS — R39.15 URINARY URGENCY: ICD-10-CM

## 2022-08-30 DIAGNOSIS — R31.9 HEMATURIA, UNSPECIFIED TYPE: ICD-10-CM

## 2022-08-30 DIAGNOSIS — R35.0 URINARY FREQUENCY: Primary | ICD-10-CM

## 2022-08-30 RX ORDER — SULFAMETHOXAZOLE AND TRIMETHOPRIM 800; 160 MG/1; MG/1
1 TABLET ORAL 2 TIMES DAILY
Qty: 6 TABLET | Refills: 0 | Status: SHIPPED | OUTPATIENT
Start: 2022-08-30 | End: 2022-09-03

## 2022-08-30 NOTE — TELEPHONE ENCOUNTER
----- Message from Sarah Mckeon sent at 8/29/2022  5:23 PM CDT -----  Regarding: Possible bladder infection   I think I may have another bladder infection.   I have severe urgency and I noticed a small amount of blood

## 2022-08-30 NOTE — TELEPHONE ENCOUNTER
Dr Sara Devine, patient c/o urinary urgency that started Saturday going into Sunday. Patient c/o some bladder spasm on end of voiding along with some foul smell on occasion. She also noted some hematuria on a couple occasions as well. Patient will submit urine to [facility] today for urinalysis and culture.

## 2022-09-03 ENCOUNTER — TELEPHONE (OUTPATIENT)
Dept: INTERNAL MEDICINE CLINIC | Facility: CLINIC | Age: 67
End: 2022-09-03

## 2022-09-03 LAB
APPEARANCE: CLEAR
BILIRUBIN: NEGATIVE
COLOR: YELLOW
GLUCOSE: NEGATIVE
KETONES: NEGATIVE
NITRITE: NEGATIVE
PH: 7.5 (ref 5–8)
PROTEIN: NEGATIVE
SPECIFIC GRAVITY: 1 (ref 1–1.03)

## 2022-09-03 RX ORDER — NITROFURANTOIN 25; 75 MG/1; MG/1
100 CAPSULE ORAL 2 TIMES DAILY
Qty: 10 CAPSULE | Refills: 0 | Status: SHIPPED | OUTPATIENT
Start: 2022-09-03

## 2022-09-03 NOTE — TELEPHONE ENCOUNTER
Called pt and LM on  re results of urine cx -- E.coli -- resistant to the Bactrim she's taking. Will change to macrobid 100mg BID x 5 days. Rx [facility] [place].   Pt also notified via [address]

## 2022-09-24 ENCOUNTER — PATIENT MESSAGE (OUTPATIENT)
Dept: INTERNAL MEDICINE CLINIC | Facility: CLINIC | Age: 67
End: 2022-09-24

## 2022-09-25 ENCOUNTER — APPOINTMENT (OUTPATIENT)
Dept: ULTRASOUND IMAGING | Age: 67
End: 2022-09-25
Attending: EMERGENCY MEDICINE

## 2022-09-25 ENCOUNTER — HOSPITAL ENCOUNTER (OUTPATIENT)
Age: 67
Discharge: HOME | End: 2022-09-25
Attending: EMERGENCY MEDICINE

## 2022-09-25 VITALS
DIASTOLIC BLOOD PRESSURE: 77 MMHG | SYSTOLIC BLOOD PRESSURE: 153 MMHG | RESPIRATION RATE: 20 BRPM | TEMPERATURE: 97 F | HEART RATE: 65 BPM | OXYGEN SATURATION: 98 %

## 2022-09-25 DIAGNOSIS — M79.89 LEG SWELLING: Primary | ICD-10-CM

## 2022-09-25 PROCEDURE — 99214 OFFICE O/P EST MOD 30 MIN: CPT

## 2022-09-25 PROCEDURE — 93971 EXTREMITY STUDY: CPT | Performed by: EMERGENCY MEDICINE

## 2022-09-25 PROCEDURE — 99203 OFFICE O/P NEW LOW 30 MIN: CPT

## 2022-09-25 NOTE — ED INITIAL ASSESSMENT (HPI)
Patient reports injuring her left leg a few weeks ago, states she slipped while on bleachers and struck her left shin. Patient has noted increased swelling to left leg as well as redness to the area. Reports recent cruise with airplane travel as well.

## 2022-09-26 NOTE — TELEPHONE ENCOUNTER
From: Darcy Reyes  To: Tosin Connolly MD  Sent: 9/24/2022 3:17 PM CDT  Subject: Hawa Mccarthy. I fell on some bleachers 2 weeks ago . It was swollen and bruised then but now it is still swells and is red.  Not sure if I need it checked soon but it is bothering me quite a bit

## 2022-10-13 ENCOUNTER — OFFICE VISIT (OUTPATIENT)
Dept: INTERNAL MEDICINE CLINIC | Facility: CLINIC | Age: 67
End: 2022-10-13
Payer: COMMERCIAL

## 2022-10-13 VITALS
HEIGHT: 64 IN | DIASTOLIC BLOOD PRESSURE: 76 MMHG | HEART RATE: 77 BPM | SYSTOLIC BLOOD PRESSURE: 134 MMHG | BODY MASS INDEX: 25.33 KG/M2 | WEIGHT: 148.38 LBS | OXYGEN SATURATION: 96 % | TEMPERATURE: 98 F

## 2022-10-13 DIAGNOSIS — G60.9 CHRONIC IDIOPATHIC AXONAL POLYNEUROPATHY: ICD-10-CM

## 2022-10-13 DIAGNOSIS — M85.80 OSTEOPENIA, UNSPECIFIED LOCATION: ICD-10-CM

## 2022-10-13 DIAGNOSIS — R73.9 HYPERGLYCEMIA: ICD-10-CM

## 2022-10-13 DIAGNOSIS — Z12.4 SCREENING FOR CERVICAL CANCER: ICD-10-CM

## 2022-10-13 DIAGNOSIS — Z12.31 ENCOUNTER FOR SCREENING MAMMOGRAM FOR MALIGNANT NEOPLASM OF BREAST: Primary | ICD-10-CM

## 2022-10-13 DIAGNOSIS — Z00.00 ROUTINE HEALTH MAINTENANCE: ICD-10-CM

## 2022-10-13 DIAGNOSIS — E55.9 VITAMIN D DEFICIENCY: ICD-10-CM

## 2022-10-13 PROCEDURE — 3078F DIAST BP <80 MM HG: CPT | Performed by: INTERNAL MEDICINE

## 2022-10-13 PROCEDURE — 90471 IMMUNIZATION ADMIN: CPT | Performed by: INTERNAL MEDICINE

## 2022-10-13 PROCEDURE — 90662 IIV NO PRSV INCREASED AG IM: CPT | Performed by: INTERNAL MEDICINE

## 2022-10-13 PROCEDURE — 3008F BODY MASS INDEX DOCD: CPT | Performed by: INTERNAL MEDICINE

## 2022-10-13 PROCEDURE — 99397 PER PM REEVAL EST PAT 65+ YR: CPT | Performed by: INTERNAL MEDICINE

## 2022-10-13 PROCEDURE — 90677 PCV20 VACCINE IM: CPT | Performed by: INTERNAL MEDICINE

## 2022-10-13 PROCEDURE — 90472 IMMUNIZATION ADMIN EACH ADD: CPT | Performed by: INTERNAL MEDICINE

## 2022-10-13 PROCEDURE — 3075F SYST BP GE 130 - 139MM HG: CPT | Performed by: INTERNAL MEDICINE

## 2022-10-13 RX ORDER — CYCLOBENZAPRINE HCL 10 MG
10 TABLET ORAL 3 TIMES DAILY PRN
Qty: 30 TABLET | Refills: 0 | Status: SHIPPED | OUTPATIENT
Start: 2022-10-13

## 2022-10-13 RX ORDER — VALACYCLOVIR HYDROCHLORIDE 1 G/1
TABLET, FILM COATED ORAL
Qty: 20 TABLET | Refills: 1 | Status: SHIPPED | OUTPATIENT
Start: 2022-10-13

## 2022-10-18 LAB
ABSOLUTE BASOPHILS: 32 CELLS/UL (ref 0–200)
ABSOLUTE EOSINOPHILS: 315 CELLS/UL (ref 15–500)
ABSOLUTE LYMPHOCYTES: 1161 CELLS/UL (ref 850–3900)
ABSOLUTE MONOCYTES: 495 CELLS/UL (ref 200–950)
ABSOLUTE NEUTROPHILS: 2498 CELLS/UL (ref 1500–7800)
ALBUMIN/GLOBULIN RATIO: 1.7 (CALC) (ref 1–2.5)
ALBUMIN: 4.3 G/DL (ref 3.6–5.1)
ALKALINE PHOSPHATASE: 93 U/L (ref 37–153)
ALT: 24 U/L (ref 6–29)
AST: 21 U/L (ref 10–35)
BASOPHILS: 0.7 %
BILIRUBIN, TOTAL: 0.8 MG/DL (ref 0.2–1.2)
BUN: 21 MG/DL (ref 7–25)
CALCIUM: 9.7 MG/DL (ref 8.6–10.4)
CARBON DIOXIDE: 28 MMOL/L (ref 20–32)
CHLORIDE: 106 MMOL/L (ref 98–110)
CHOL/HDLC RATIO: 2.4 (CALC)
CHOLESTEROL, TOTAL: 203 MG/DL
CREATININE: 0.74 MG/DL (ref 0.5–1.05)
EGFR: 90 ML/MIN/1.73M2
EOSINOPHILS: 7 %
GLOBULIN: 2.5 G/DL (CALC) (ref 1.9–3.7)
GLUCOSE: 86 MG/DL (ref 65–99)
HDL CHOLESTEROL: 85 MG/DL
HEMATOCRIT: 41.1 % (ref 35–45)
HEMOGLOBIN A1C: 5.5 % OF TOTAL HGB
HEMOGLOBIN: 13.6 G/DL (ref 11.7–15.5)
LDL-CHOLESTEROL: 104 MG/DL (CALC)
LYMPHOCYTES: 25.8 %
MCH: 30.2 PG (ref 27–33)
MCHC: 33.1 G/DL (ref 32–36)
MCV: 91.3 FL (ref 80–100)
MONOCYTES: 11 %
MPV: 10.9 FL (ref 7.5–12.5)
NEUTROPHILS: 55.5 %
NON-HDL CHOLESTEROL: 118 MG/DL (CALC)
PLATELET COUNT: 245 THOUSAND/UL (ref 140–400)
POTASSIUM: 4.9 MMOL/L (ref 3.5–5.3)
PROTEIN, TOTAL: 6.8 G/DL (ref 6.1–8.1)
RDW: 13.1 % (ref 11–15)
RED BLOOD CELL COUNT: 4.5 MILLION/UL (ref 3.8–5.1)
SODIUM: 140 MMOL/L (ref 135–146)
TRIGLYCERIDES: 46 MG/DL
VITAMIN D, 25-OH, TOTAL: 29 NG/ML (ref 30–100)
WHITE BLOOD CELL COUNT: 4.5 THOUSAND/UL (ref 3.8–10.8)

## 2022-10-28 LAB — HPV I/H RISK 1 DNA SPEC QL NAA+PROBE: NEGATIVE

## 2022-11-23 ENCOUNTER — MED REC SCAN ONLY (OUTPATIENT)
Dept: INTERNAL MEDICINE CLINIC | Facility: CLINIC | Age: 67
End: 2022-11-23

## 2022-12-01 ENCOUNTER — HOSPITAL ENCOUNTER (OUTPATIENT)
Dept: MAMMOGRAPHY | Age: 67
Discharge: HOME | End: 2022-12-01
Attending: INTERNAL MEDICINE
Payer: COMMERCIAL

## 2022-12-01 DIAGNOSIS — Z12.31 ENCOUNTER FOR SCREENING MAMMOGRAM FOR MALIGNANT NEOPLASM OF BREAST: ICD-10-CM

## 2022-12-01 PROCEDURE — 77063 BREAST TOMOSYNTHESIS BI: CPT | Performed by: INTERNAL MEDICINE

## 2022-12-01 PROCEDURE — 77067 SCR MAMMO BI INCL CAD: CPT | Performed by: INTERNAL MEDICINE

## 2023-10-04 RX ORDER — VALACYCLOVIR HYDROCHLORIDE 1 G/1
TABLET, FILM COATED ORAL
Qty: 4 TABLET | Refills: 0 | Status: SHIPPED | OUTPATIENT
Start: 2023-10-04

## 2023-10-04 NOTE — TELEPHONE ENCOUNTER
Mycbaldomerot due for visit    Refill request is for a maintenance medication and has met the criteria specified in the Ambulatory Medication Refill Standing Order for eligibility, visits, laboratory, alerts and was sent to the requested pharmacy.     Requested Prescriptions     Signed Prescriptions Disp Refills    valACYclovir (VALTREX) 1 G Oral Tab 4 tablet 0     Sig: Take 1 tablet by oral route every 12 hours x 2 doses     Authorizing Provider: Lori Herrera     Ordering User: Adeline Mendez

## 2023-11-20 ENCOUNTER — OFFICE VISIT (OUTPATIENT)
Dept: INTERNAL MEDICINE CLINIC | Facility: CLINIC | Age: 68
End: 2023-11-20

## 2023-11-20 ENCOUNTER — LAB ENCOUNTER (OUTPATIENT)
Dept: LAB | Age: 68
End: 2023-11-20
Attending: INTERNAL MEDICINE
Payer: MEDICARE

## 2023-11-20 VITALS
OXYGEN SATURATION: 98 % | SYSTOLIC BLOOD PRESSURE: 110 MMHG | BODY MASS INDEX: 25.1 KG/M2 | DIASTOLIC BLOOD PRESSURE: 80 MMHG | HEART RATE: 78 BPM | HEIGHT: 64 IN | WEIGHT: 147 LBS

## 2023-11-20 DIAGNOSIS — Z00.00 ROUTINE HEALTH MAINTENANCE: ICD-10-CM

## 2023-11-20 DIAGNOSIS — E55.9 VITAMIN D DEFICIENCY: ICD-10-CM

## 2023-11-20 DIAGNOSIS — R73.03 PREDIABETES: ICD-10-CM

## 2023-11-20 DIAGNOSIS — R73.9 HYPERGLYCEMIA: ICD-10-CM

## 2023-11-20 DIAGNOSIS — G60.9 CHRONIC IDIOPATHIC AXONAL POLYNEUROPATHY: ICD-10-CM

## 2023-11-20 DIAGNOSIS — M85.89 OSTEOPENIA OF MULTIPLE SITES: Primary | ICD-10-CM

## 2023-11-20 DIAGNOSIS — Z78.0 POSTMENOPAUSE: ICD-10-CM

## 2023-11-20 DIAGNOSIS — Z12.31 ENCOUNTER FOR SCREENING MAMMOGRAM FOR MALIGNANT NEOPLASM OF BREAST: ICD-10-CM

## 2023-11-20 DIAGNOSIS — R53.83 OTHER FATIGUE: ICD-10-CM

## 2023-11-20 LAB
ALBUMIN SERPL-MCNC: 4.5 G/DL (ref 3.2–4.8)
ALBUMIN/GLOB SERPL: 1.8 {RATIO} (ref 1–2)
ALP LIVER SERPL-CCNC: 60 U/L
ALT SERPL-CCNC: 13 U/L
ANION GAP SERPL CALC-SCNC: 6 MMOL/L (ref 0–18)
AST SERPL-CCNC: 20 U/L (ref ?–34)
BASOPHILS # BLD AUTO: 0.04 X10(3) UL (ref 0–0.2)
BASOPHILS NFR BLD AUTO: 0.9 %
BILIRUB SERPL-MCNC: 0.7 MG/DL (ref 0.2–1.1)
BUN BLD-MCNC: 20 MG/DL (ref 9–23)
BUN/CREAT SERPL: 24.1 (ref 10–20)
CALCIUM BLD-MCNC: 9.7 MG/DL (ref 8.7–10.4)
CHLORIDE SERPL-SCNC: 107 MMOL/L (ref 98–112)
CHOLEST SERPL-MCNC: 229 MG/DL (ref ?–200)
CO2 SERPL-SCNC: 27 MMOL/L (ref 21–32)
CREAT BLD-MCNC: 0.83 MG/DL
DEPRECATED RDW RBC AUTO: 44.3 FL (ref 35.1–46.3)
EGFRCR SERPLBLD CKD-EPI 2021: 78 ML/MIN/1.73M2 (ref 60–?)
EOSINOPHIL # BLD AUTO: 0.24 X10(3) UL (ref 0–0.7)
EOSINOPHIL NFR BLD AUTO: 5.3 %
ERYTHROCYTE [DISTWIDTH] IN BLOOD BY AUTOMATED COUNT: 13.4 % (ref 11–15)
EST. AVERAGE GLUCOSE BLD GHB EST-MCNC: 120 MG/DL (ref 68–126)
FASTING PATIENT LIPID ANSWER: YES
FASTING STATUS PATIENT QL REPORTED: YES
GLOBULIN PLAS-MCNC: 2.5 G/DL (ref 2.8–4.4)
GLUCOSE BLD-MCNC: 101 MG/DL (ref 70–99)
HBA1C MFR BLD: 5.8 % (ref ?–5.7)
HCT VFR BLD AUTO: 38 %
HDLC SERPL-MCNC: 76 MG/DL (ref 40–59)
HGB BLD-MCNC: 12.4 G/DL
IMM GRANULOCYTES # BLD AUTO: 0.02 X10(3) UL (ref 0–1)
IMM GRANULOCYTES NFR BLD: 0.4 %
LDLC SERPL CALC-MCNC: 140 MG/DL (ref ?–100)
LYMPHOCYTES # BLD AUTO: 1.2 X10(3) UL (ref 1–4)
LYMPHOCYTES NFR BLD AUTO: 26.4 %
MCH RBC QN AUTO: 29.5 PG (ref 26–34)
MCHC RBC AUTO-ENTMCNC: 32.6 G/DL (ref 31–37)
MCV RBC AUTO: 90.5 FL
MONOCYTES # BLD AUTO: 0.45 X10(3) UL (ref 0.1–1)
MONOCYTES NFR BLD AUTO: 9.9 %
NEUTROPHILS # BLD AUTO: 2.6 X10 (3) UL (ref 1.5–7.7)
NEUTROPHILS # BLD AUTO: 2.6 X10(3) UL (ref 1.5–7.7)
NEUTROPHILS NFR BLD AUTO: 57.1 %
NONHDLC SERPL-MCNC: 153 MG/DL (ref ?–130)
OSMOLALITY SERPL CALC.SUM OF ELEC: 293 MOSM/KG (ref 275–295)
PLATELET # BLD AUTO: 203 10(3)UL (ref 150–450)
POTASSIUM SERPL-SCNC: 4.1 MMOL/L (ref 3.5–5.1)
PROT SERPL-MCNC: 7 G/DL (ref 5.7–8.2)
RBC # BLD AUTO: 4.2 X10(6)UL
SODIUM SERPL-SCNC: 140 MMOL/L (ref 136–145)
TRIGL SERPL-MCNC: 74 MG/DL (ref 30–149)
TSI SER-ACNC: 4.35 MIU/ML (ref 0.55–4.78)
VIT D+METAB SERPL-MCNC: 36.2 NG/ML (ref 30–100)
VLDLC SERPL CALC-MCNC: 14 MG/DL (ref 0–30)
WBC # BLD AUTO: 4.6 X10(3) UL (ref 4–11)

## 2023-11-20 PROCEDURE — 80061 LIPID PANEL: CPT | Performed by: INTERNAL MEDICINE

## 2023-11-20 PROCEDURE — 82306 VITAMIN D 25 HYDROXY: CPT

## 2023-11-20 PROCEDURE — 36415 COLL VENOUS BLD VENIPUNCTURE: CPT | Performed by: INTERNAL MEDICINE

## 2023-11-20 PROCEDURE — 85025 COMPLETE CBC W/AUTO DIFF WBC: CPT | Performed by: INTERNAL MEDICINE

## 2023-11-20 PROCEDURE — 80053 COMPREHEN METABOLIC PANEL: CPT | Performed by: INTERNAL MEDICINE

## 2023-11-20 PROCEDURE — 84443 ASSAY THYROID STIM HORMONE: CPT | Performed by: INTERNAL MEDICINE

## 2023-11-20 PROCEDURE — 83036 HEMOGLOBIN GLYCOSYLATED A1C: CPT | Performed by: INTERNAL MEDICINE

## 2023-11-20 RX ORDER — VALACYCLOVIR HYDROCHLORIDE 1 G/1
TABLET, FILM COATED ORAL
Qty: 30 TABLET | Refills: 1 | Status: SHIPPED | OUTPATIENT
Start: 2023-11-20

## 2023-12-01 RX ORDER — VALACYCLOVIR HYDROCHLORIDE 1 G/1
TABLET, FILM COATED ORAL
Qty: 30 TABLET | Refills: 1 | Status: CANCELLED | OUTPATIENT
Start: 2023-12-01

## 2023-12-04 ENCOUNTER — HOSPITAL ENCOUNTER (OUTPATIENT)
Dept: MAMMOGRAPHY | Age: 68
Discharge: HOME | End: 2023-12-04
Attending: INTERNAL MEDICINE
Payer: MEDICARE

## 2023-12-04 DIAGNOSIS — Z12.31 ENCOUNTER FOR SCREENING MAMMOGRAM FOR MALIGNANT NEOPLASM OF BREAST: ICD-10-CM

## 2023-12-04 PROCEDURE — 77067 SCR MAMMO BI INCL CAD: CPT | Performed by: INTERNAL MEDICINE

## 2023-12-04 PROCEDURE — 77063 BREAST TOMOSYNTHESIS BI: CPT | Performed by: INTERNAL MEDICINE

## 2023-12-14 ENCOUNTER — HOSPITAL ENCOUNTER (OUTPATIENT)
Dept: MAMMOGRAPHY | Facility: HOSPITAL | Age: 68
Discharge: HOME | End: 2023-12-14
Attending: INTERNAL MEDICINE
Payer: MEDICARE

## 2023-12-14 DIAGNOSIS — R92.2 INCONCLUSIVE MAMMOGRAM: ICD-10-CM

## 2023-12-14 PROCEDURE — 77061 BREAST TOMOSYNTHESIS UNI: CPT | Performed by: INTERNAL MEDICINE

## 2023-12-14 PROCEDURE — 77065 DX MAMMO INCL CAD UNI: CPT | Performed by: INTERNAL MEDICINE

## 2023-12-20 ENCOUNTER — HOSPITAL ENCOUNTER (OUTPATIENT)
Dept: BONE DENSITY | Facility: HOSPITAL | Age: 68
Discharge: HOME | End: 2023-12-20
Attending: INTERNAL MEDICINE
Payer: MEDICARE

## 2023-12-20 DIAGNOSIS — Z78.0 POSTMENOPAUSE: ICD-10-CM

## 2023-12-20 PROCEDURE — 77080 DXA BONE DENSITY AXIAL: CPT | Performed by: INTERNAL MEDICINE

## 2024-05-07 ENCOUNTER — HOSPITAL ENCOUNTER (OUTPATIENT)
Age: 69
Discharge: HOME | End: 2024-05-07
Attending: EMERGENCY MEDICINE
Payer: MEDICARE

## 2024-05-07 ENCOUNTER — APPOINTMENT (OUTPATIENT)
Dept: GENERAL RADIOLOGY | Age: 69
End: 2024-05-07
Attending: EMERGENCY MEDICINE
Payer: MEDICARE

## 2024-05-07 VITALS
HEART RATE: 74 BPM | TEMPERATURE: 100 F | SYSTOLIC BLOOD PRESSURE: 156 MMHG | DIASTOLIC BLOOD PRESSURE: 90 MMHG | RESPIRATION RATE: 16 BRPM | OXYGEN SATURATION: 100 %

## 2024-05-07 DIAGNOSIS — S83.91XA SPRAIN OF RIGHT KNEE, UNSPECIFIED LIGAMENT, INITIAL ENCOUNTER: Primary | ICD-10-CM

## 2024-05-07 PROCEDURE — 99214 OFFICE O/P EST MOD 30 MIN: CPT

## 2024-05-07 PROCEDURE — 73562 X-RAY EXAM OF KNEE 3: CPT | Performed by: EMERGENCY MEDICINE

## 2024-05-07 PROCEDURE — 99213 OFFICE O/P EST LOW 20 MIN: CPT

## 2024-05-07 NOTE — ED PROVIDER NOTES
Patient Seen in: [facility] [place]      History     Chief Complaint   Patient presents with    Knee Pain     Stated Complaint: Rt knee pain    Subjective:   HPI    Patient is a 68-year-old female with no significant past medical history presents now with right knee pain.  The patient states she initially injured the knee approximately 10 days ago while walking down some stairs.  The patient states she twisted the knee awkwardly and developed some pain.  Since that time, the patient has had intermittent but progressively worsening right knee pain.  The patient states the pain today is more pronounced on the medial aspect of the right knee.  Patient denies any pain in the ankle or foot.  The patient denies any noted leg swelling.  States the pain worsens with weightbearing.    Objective:   No pertinent past medical history.            No pertinent past surgical history.              No pertinent social history.            Review of Systems    Positive for stated complaint: Rt knee pain  Other systems are as noted in HPI.  Constitutional and vital signs reviewed.      All other systems reviewed and negative except as noted above.    Physical Exam     ED Triage Vitals [05/07/24 1642]   /90   Pulse 74   Resp 16   Temp 99.5 °F (37.5 °C)   Temp src Temporal   SpO2 100 %   O2 Device None (Room air)       Current Vitals:   Vital Signs  BP: 156/90  Pulse: 74  Resp: 16  Temp: 99.5 °F (37.5 °C)  Temp src: Temporal    Oxygen Therapy  SpO2: 100 %  O2 Device: None (Room air)            Physical Exam    Constitutional: Well-developed well-nourished in no acute distress  Head: Normocephalic, no swelling or tenderness  Eyes: Nonicteric sclera, no conjunctival injection  Vascular: Easily palpable posterior tibial pulse on the right  Neurologic: Patient is awake, alert and oriented ×3.  The patient's motor strength is 5 out of 5 and symmetric in the upper and lower extremities bilaterally  Extremities: Mild tenderness to  the medial aspect of the right knee.  Flexion and extension are intact.  There is no significant swelling of the knee or the right lower leg.  Skin: No pallor, no redness or warmth to the touch      ED Course   Labs Reviewed - No data to display          Patient's x-ray images were independently reviewed by myself.  There is no acute fracture seen.    Patient's x-ray results were discussed with her.  Will place in knee immobilizer.  The patient prefers a cane to a walker or crutches.  Will provide with orthopedic follow-up.  Recommend anti-inflammatories for pain and swelling    MDM      Left knee sprain versus fracture versus meniscal injury                                   Medical Decision Making      Disposition and Plan     Clinical Impression:  1. Sprain of right knee, unspecified ligament, initial encounter         Disposition:  Discharge  5/7/2024  6:10 pm    Follow-up:  Ivonne Short MD  [address],  [place] [address]  [phone]      Call for an appointment          Medications Prescribed:  Current Discharge Medication List

## 2024-05-07 NOTE — ED INITIAL ASSESSMENT (HPI)
Patient with right knee pain after walking down a spiral staircase 1 week ago. She was also recently on vacation and was walking a lot more than normal.  She has used Motrin/ ice/ wrapping at home with some relief.   Pain is the right outer knee

## 2024-05-23 ENCOUNTER — OFFICE VISIT (OUTPATIENT)
Dept: INTERNAL MEDICINE CLINIC | Facility: CLINIC | Age: 69
End: 2024-05-23

## 2024-05-23 VITALS
SYSTOLIC BLOOD PRESSURE: 124 MMHG | HEIGHT: 64 IN | OXYGEN SATURATION: 97 % | DIASTOLIC BLOOD PRESSURE: 80 MMHG | TEMPERATURE: 98 F | HEART RATE: 80 BPM | BODY MASS INDEX: 24.75 KG/M2 | WEIGHT: 145 LBS

## 2024-05-23 DIAGNOSIS — S83.206D TEAR OF MENISCUS OF RIGHT KNEE AS CURRENT INJURY, UNSPECIFIED MENISCUS, UNSPECIFIED TEAR TYPE, SUBSEQUENT ENCOUNTER: ICD-10-CM

## 2024-05-23 DIAGNOSIS — Z01.818 PREOP EXAMINATION: Primary | ICD-10-CM

## 2024-05-23 LAB
ATRIAL RATE: 70 BPM
P AXIS: 21 DEGREES
P-R INTERVAL: 172 MS
Q-T INTERVAL: 368 MS
QRS DURATION: 76 MS
QTC CALCULATION (BEZET): 397 MS
R AXIS: -20 DEGREES
T AXIS: 20 DEGREES
VENTRICULAR RATE: 70 BPM

## 2024-05-23 PROCEDURE — 93000 ELECTROCARDIOGRAM COMPLETE: CPT | Performed by: INTERNAL MEDICINE

## 2024-05-23 PROCEDURE — 99214 OFFICE O/P EST MOD 30 MIN: CPT | Performed by: INTERNAL MEDICINE

## 2024-05-23 NOTE — PROGRESS NOTES
Discharge instructions given. Patient verbalizes understanding. No other noted or stated problems at this time. Patient will follow up with primary care and dentistry.       Khoa Vincent RN  04/29/23 1930 Minna Mckeon is a 68 year old female.  Chief Complaint   Patient presents with    Pre-Op Exam     Right knee surgery      HPI:       Here for pre-op evaluation for R arthroscopic knee surgery (meniscectomy for meniscal tear) by Dr. Chandra Payton ([place]  She thinks she injured the knee going up a spiral staircase - felt a pop in her knee.  Intermittent pain since then.    Was exercising regularly prior to injury -- treadmill, weights.    No chest pain or SOB      Current Outpatient Medications   Medication Sig Dispense Refill    valACYclovir (VALTREX) 1 G Oral Tab Take 1 tablet by oral route every 12 hours x 2 doses prn for outbreak 30 tablet 1    Cholecalciferol 50 MCG (2000 UT) Oral Cap Take 4,000 Units by mouth daily. 30 capsule 0    Multiple Vitamin (ONE-DAILY MULTI VITAMINS) Oral Tab Take 1 tablet by mouth daily.      Calcium Carbonate-Vitamin D (CALCIUM 500/VITAMIN D OR) Take 1 tablet by mouth daily.        Past Medical History:    Allergic rhinitis    Factor V Leiden (HCC)    High cholesterol    Osteopenia      Social History:  Social History     Socioeconomic History    Marital status:    Tobacco Use    Smoking status: Former     Current packs/day: 0.00     Average packs/day: 0.1 packs/day for 3.0 years (0.3 ttl pk-yrs)     Types: Cigarettes     Start date: 6/15/1982     Quit date: 6/15/1985     Years since quittin.9    Smokeless tobacco: Former   Vaping Use    Vaping status: Never Used   Substance and Sexual Activity    Alcohol use: Yes     Alcohol/week: 2.0 standard drinks of alcohol     Types: 1 Glasses of wine, 1 Cans of beer per week     Comment: Occasional    Drug use: No   Other Topics Concern    Caffeine Concern Yes     Comment: Coffee 2 cups daily; Tea     Social Determinants of Health      [facility]    Social Connections    Received from [facility], [facility]     Housing Stability        REVIEW OF SYSTEMS:   GENERAL HEALTH: feels well otherwise  RESPIRATORY: no SOB  CARDIOVASCULAR: no chest pain/pressure  GI: no nausea, vomiting, diarrhea    Wt Readings from Last 5 Encounters:   05/23/24 145 lb (65.8 kg)   11/20/23 147 lb (66.7 kg)   10/13/22 148 lb 6.4 oz (67.3 kg)   10/13/21 149 lb (67.6 kg)   10/23/20 146 lb (66.2 kg)     Body mass index is 24.89 kg/m².      EXAM:   /80 (BP Location: Right arm, Patient Position: Sitting, Cuff Size: adult)   Pulse 80   Temp 98.1 °F (36.7 °C) (Oral)   Ht 5' 4\" (1.626 m)   Wt 145 lb (65.8 kg)   SpO2 97%   BMI 24.89 kg/m²   GENERAL: well developed, well nourished, in no apparent distress  NECK: supple, no adenopathy, no bruits  LUNGS: clear to auscultation  CARDIO: RRR, normal S1S2, without murmur or gallop  GI: soft, NT, ND, NABS, no HSM  EXTREMITIES: no cyanosis, clubbing or edema, +2 DP pulses b/l    ASSESSMENT AND PLAN:     R meniscal tear  Pre-op evaluation  -pt is asymptomatic from a cardiopulmonary standpoint  -good exercise tolerance; able to easily achieve 4 METs  -EKG normal, unchanged from 10/23/20  -check labs; check CXR per ortho request  -pending normal labs, pt is deemed low risk for arthroscopic knee surgery    The patient indicates understanding of these issues and agrees to the plan.    Laura Porter MD, 05/23/24, 4:18 PM

## 2024-05-24 ENCOUNTER — LAB ENCOUNTER (OUTPATIENT)
Dept: LAB | Age: 69
End: 2024-05-24
Attending: ORTHOPAEDIC SURGERY

## 2024-05-24 ENCOUNTER — HOSPITAL ENCOUNTER (OUTPATIENT)
Dept: GENERAL RADIOLOGY | Age: 69
Discharge: HOME | End: 2024-05-24
Attending: ORTHOPAEDIC SURGERY

## 2024-05-24 DIAGNOSIS — M25.9 KNEE PROBLEM: ICD-10-CM

## 2024-05-24 DIAGNOSIS — R79.89 OTHER SPECIFIED ABNORMAL FINDINGS OF BLOOD CHEMISTRY: ICD-10-CM

## 2024-05-24 DIAGNOSIS — R26.9 GAIT ABNORMALITY: ICD-10-CM

## 2024-05-24 DIAGNOSIS — R26.9 ABNORMALITY OF GAIT: ICD-10-CM

## 2024-05-24 DIAGNOSIS — S83.241A OTHER TEAR OF MEDIAL MENISCUS, CURRENT INJURY, RIGHT KNEE, INITIAL ENCOUNTER: ICD-10-CM

## 2024-05-24 DIAGNOSIS — S83.241A OTHER TEAR OF MEDIAL MENISCUS, CURRENT INJURY, RIGHT KNEE, INITIAL ENCOUNTER: Primary | ICD-10-CM

## 2024-05-24 LAB
ALBUMIN SERPL-MCNC: 4.4 G/DL (ref 3.2–4.8)
ALBUMIN/GLOB SERPL: 1.6 {RATIO} (ref 1–2)
ALP LIVER SERPL-CCNC: 63 U/L
ALT SERPL-CCNC: 11 U/L
ANION GAP SERPL CALC-SCNC: 5 MMOL/L (ref 0–18)
AST SERPL-CCNC: 19 U/L (ref ?–34)
BASOPHILS # BLD AUTO: 0.04 X10(3) UL (ref 0–0.2)
BASOPHILS NFR BLD AUTO: 0.7 %
BILIRUB SERPL-MCNC: 0.6 MG/DL (ref 0.2–1.1)
BUN BLD-MCNC: 23 MG/DL (ref 9–23)
BUN/CREAT SERPL: 27.1 (ref 10–20)
CALCIUM BLD-MCNC: 9.9 MG/DL (ref 8.7–10.4)
CHLORIDE SERPL-SCNC: 108 MMOL/L (ref 98–112)
CO2 SERPL-SCNC: 28 MMOL/L (ref 21–32)
CREAT BLD-MCNC: 0.85 MG/DL
DEPRECATED RDW RBC AUTO: 47.3 FL (ref 35.1–46.3)
EGFRCR SERPLBLD CKD-EPI 2021: 75 ML/MIN/1.73M2 (ref 60–?)
EOSINOPHIL # BLD AUTO: 0.27 X10(3) UL (ref 0–0.7)
EOSINOPHIL NFR BLD AUTO: 5.1 %
ERYTHROCYTE [DISTWIDTH] IN BLOOD BY AUTOMATED COUNT: 14.9 % (ref 11–15)
EST. AVERAGE GLUCOSE BLD GHB EST-MCNC: 114 MG/DL (ref 68–126)
FASTING STATUS PATIENT QL REPORTED: YES
GLOBULIN PLAS-MCNC: 2.7 G/DL (ref 2–3.5)
GLUCOSE BLD-MCNC: 94 MG/DL (ref 70–99)
HBA1C MFR BLD: 5.6 % (ref ?–5.7)
HCT VFR BLD AUTO: 39.8 %
HGB BLD-MCNC: 13 G/DL
IMM GRANULOCYTES # BLD AUTO: 0.01 X10(3) UL (ref 0–1)
IMM GRANULOCYTES NFR BLD: 0.2 %
LYMPHOCYTES # BLD AUTO: 1.71 X10(3) UL (ref 1–4)
LYMPHOCYTES NFR BLD AUTO: 32 %
MCH RBC QN AUTO: 28.3 PG (ref 26–34)
MCHC RBC AUTO-ENTMCNC: 32.7 G/DL (ref 31–37)
MCV RBC AUTO: 86.7 FL
MONOCYTES # BLD AUTO: 0.53 X10(3) UL (ref 0.1–1)
MONOCYTES NFR BLD AUTO: 9.9 %
NEUTROPHILS # BLD AUTO: 2.78 X10 (3) UL (ref 1.5–7.7)
NEUTROPHILS # BLD AUTO: 2.78 X10(3) UL (ref 1.5–7.7)
NEUTROPHILS NFR BLD AUTO: 52.1 %
OSMOLALITY SERPL CALC.SUM OF ELEC: 295 MOSM/KG (ref 275–295)
PLATELET # BLD AUTO: 214 10(3)UL (ref 150–450)
POTASSIUM SERPL-SCNC: 4.8 MMOL/L (ref 3.5–5.1)
PROT SERPL-MCNC: 7.1 G/DL (ref 5.7–8.2)
RBC # BLD AUTO: 4.59 X10(6)UL
SODIUM SERPL-SCNC: 141 MMOL/L (ref 136–145)
WBC # BLD AUTO: 5.3 X10(3) UL (ref 4–11)

## 2024-05-24 PROCEDURE — 80053 COMPREHEN METABOLIC PANEL: CPT

## 2024-05-24 PROCEDURE — 83036 HEMOGLOBIN GLYCOSYLATED A1C: CPT

## 2024-05-24 PROCEDURE — 36415 COLL VENOUS BLD VENIPUNCTURE: CPT

## 2024-05-24 PROCEDURE — 71046 X-RAY EXAM CHEST 2 VIEWS: CPT | Performed by: ORTHOPAEDIC SURGERY

## 2024-05-24 PROCEDURE — 85025 COMPLETE CBC W/AUTO DIFF WBC: CPT

## 2024-05-28 ENCOUNTER — TELEPHONE (OUTPATIENT)
Dept: INTERNAL MEDICINE CLINIC | Facility: CLINIC | Age: 69
End: 2024-05-28

## 2024-05-28 NOTE — TELEPHONE ENCOUNTER
Dr Payton's office calling to request surgical clearance be faxed including lab & chest x-ray results, EKG Tracing.  Patient is scheduled for surgery on 6/14/24    Faxing [phone]

## 2024-05-29 PROBLEM — D68.51 FACTOR V LEIDEN MUTATION (HCC): Status: ACTIVE | Noted: 2024-05-29

## 2024-05-29 NOTE — TELEPHONE ENCOUNTER
Recommend seeing Dr. Gabriel Orellana, but I have no control over his schedule.  Rec that she call his office for an appointment

## 2024-05-29 NOTE — TELEPHONE ENCOUNTER
S/w patient and relayed MD message. Provided pt with Dr rOellana's T# and she will call for an appt.

## 2024-05-29 NOTE — TELEPHONE ENCOUNTER
[facility] / [facility] calling patient is scheduled on 6/14 for surgery due to patient's blood clot history it is recommended patient see a Hematologist before surgery  Asking if Dr Porter can get her an appointment with a Hematologist    Phone [phone]

## 2024-05-30 ENCOUNTER — TELEPHONE (OUTPATIENT)
Dept: HEMATOLOGY/ONCOLOGY | Facility: HOSPITAL | Age: 69
End: 2024-05-30

## 2024-05-30 NOTE — TELEPHONE ENCOUNTER
Patient called to schedule consult for pre op. Hx of factor five leyden. Surgery scheduled June 14th. Dr quintana referring karan

## 2024-06-11 ENCOUNTER — TELEPHONE (OUTPATIENT)
Dept: HEMATOLOGY/ONCOLOGY | Facility: HOSPITAL | Age: 69
End: 2024-06-11

## 2024-06-11 NOTE — TELEPHONE ENCOUNTER
CESAR for patient inquiring if she would like to move her consult appointment to tomorrow morning with Dr. Orellana.

## 2024-06-12 ENCOUNTER — DOCUMENTATION ONLY (OUTPATIENT)
Dept: HEMATOLOGY/ONCOLOGY | Facility: HOSPITAL | Age: 69
End: 2024-06-12

## 2024-06-12 ENCOUNTER — TELEPHONE (OUTPATIENT)
Dept: HEMATOLOGY/ONCOLOGY | Facility: HOSPITAL | Age: 69
End: 2024-06-12

## 2024-06-12 ENCOUNTER — OFFICE VISIT (OUTPATIENT)
Dept: HEMATOLOGY/ONCOLOGY | Facility: HOSPITAL | Age: 69
End: 2024-06-12
Attending: INTERNAL MEDICINE
Payer: MEDICARE

## 2024-06-12 ENCOUNTER — APPOINTMENT (OUTPATIENT)
Dept: HEMATOLOGY/ONCOLOGY | Facility: HOSPITAL | Age: 69
End: 2024-06-12
Attending: STUDENT IN AN ORGANIZED HEALTH CARE EDUCATION/TRAINING PROGRAM
Payer: MEDICARE

## 2024-06-12 ENCOUNTER — LAB ENCOUNTER (OUTPATIENT)
Dept: LAB | Facility: HOSPITAL | Age: 69
End: 2024-06-12
Attending: INTERNAL MEDICINE
Payer: MEDICARE

## 2024-06-12 VITALS
HEART RATE: 65 BPM | OXYGEN SATURATION: 95 % | BODY MASS INDEX: 24.79 KG/M2 | WEIGHT: 145.19 LBS | TEMPERATURE: 98 F | RESPIRATION RATE: 16 BRPM | HEIGHT: 64 IN | SYSTOLIC BLOOD PRESSURE: 125 MMHG | DIASTOLIC BLOOD PRESSURE: 88 MMHG

## 2024-06-12 DIAGNOSIS — Z82.49 FAMILY HISTORY OF PULMONARY EMBOLISM: Primary | ICD-10-CM

## 2024-06-12 DIAGNOSIS — Z79.01 CURRENT USE OF ANTICOAGULANT THERAPY: ICD-10-CM

## 2024-06-12 DIAGNOSIS — D68.51 HETEROZYGOUS FACTOR V LEIDEN MUTATION (HCC): ICD-10-CM

## 2024-06-12 DIAGNOSIS — Z82.49 FAMILY HISTORY OF PULMONARY EMBOLISM: ICD-10-CM

## 2024-06-12 PROCEDURE — 85300 ANTITHROMBIN III ACTIVITY: CPT

## 2024-06-12 PROCEDURE — 36415 COLL VENOUS BLD VENIPUNCTURE: CPT

## 2024-06-12 PROCEDURE — 99204 OFFICE O/P NEW MOD 45 MIN: CPT | Performed by: INTERNAL MEDICINE

## 2024-06-12 NOTE — TELEPHONE ENCOUNTER
[facility]     P)[phone]  f) [phone] We need Cardiac Clearance letter patient having surgery on 6/14/24 with Dr. MEGHANA isabel.  Thanks Aline

## 2024-06-12 NOTE — PATIENT INSTRUCTIONS
Agree with plans for anticoagulation for 30 days post operatively. Patient can utilize Xarelto 10 mg/daily with food or Lovenox 40 mg/daily. Recommend the patient for early ambulation and hydration after surgery.

## 2024-06-12 NOTE — PROGRESS NOTES
Hematology Consultation Note    Patient Name: Minna Mckeon   YOB: 1955   Medical Record Number: [id number]   CSN: [id number]   Consulting Physician: Gabriel Orellana MD  Referring Physician(s): Laura Porter MD  Date of Consultation: 2024     Reason for Consultation:  Factor V Leiden mutation-Heterozygous    History of Present Illness:     Minna Mckeon is a 68 year old female that was seen today in the Hematology suite for evaluation of the above condition. Patient has never had DVT or PE.  However, her daughter suffered a pulmonary embolism at the age of 23 the setting of oral contraceptive pills.  Patient's daughter underwent testing and was found to have factor V Leiden mutation.  This event prompted Lida to undergo testing in the year  with her OB/GYN provider and mentions she was found to be heterozygous for this mutation.  She is being planned for right knee arthroscopy procedure with Dr. RINA Payton at [facility] later this week.  She mentions her orthopedic surgeon is planning her for postoperative anticoagulation.  Lida mentions her surgeon is wondering if she could be treated with Lovenox versus Xarelto for anticoagulation based on her hereditary thrombophilia risk.  Patient reports being in her usual state of health over the last 6 to 12 months.  No systemic signs of illness.  Patient reports being up-to-date with mammograms and colonoscopies.  No other concerns on ROS.    Past Medical History:  Past Medical History:    Allergic rhinitis    Heterozygous factor V Leiden mutation (HCC)    no hx of DVT/PE    High cholesterol    Osteopenia       Past Surgical History:  Past Surgical History:   Procedure Laterality Date      1985    second procedure in     Colonoscopy  2023    Other surgical history  2017    Gum surgery    Other surgical history  2017    Vein Surgery        Family Medical History:  Family History   Problem Relation Age of Onset    Anemia Mother         Pernicious anemia    Cancer Mother         Monoclonal gammopathy    Hypertension Father     Genetic Disease Father         ulcers,gallbladder removed    Dementia Father     Heart Disorder Father     Stroke Father     DVT/VTE Daughter 23        Factor V Leiden, OCP induced    Breast Cancer Neg     Ovarian Cancer Neg     Bleeding Disorders Neg        Social History:  Social History     Socioeconomic History    Marital status:      Spouse name: Not on file    Number of children: Not on file    Years of education: Not on file    Highest education level: Not on file   Occupational History    Not on file   Tobacco Use    Smoking status: Former     Current packs/day: 0.00     Average packs/day: 0.1 packs/day for 3.0 years (0.3 ttl pk-yrs)     Types: Cigarettes     Start date: 6/15/1982     Quit date: 6/15/1985     Years since quittin.0    Smokeless tobacco: Former   Vaping Use    Vaping status: Never Used   Substance and Sexual Activity    Alcohol use: Yes     Alcohol/week: 2.0 standard drinks of alcohol     Types: 1 Glasses of wine, 1 Cans of beer per week     Comment: Occasional    Drug use: No    Sexual activity: Not on file   Other Topics Concern     Service Not Asked    Blood Transfusions Not Asked    Caffeine Concern Yes     Comment: Coffee 2 cups daily; Tea    Occupational Exposure Not Asked    Hobby Hazards Not Asked    Sleep Concern Not Asked    Stress Concern Not Asked    Weight Concern Not Asked    Special Diet Not Asked    Back Care Not Asked    Exercise Not Asked    Bike Helmet Not Asked    Seat Belt Not Asked    Self-Exams Not Asked   Social History Narrative    Lida is  to Jos. She has 2 adult children. Patient is a retired nurse. She lives with her  in [place], [place]     Social Determinants of Health     Financial Resource Strain: Not on file   Food Insecurity: Not on file    Transportation Needs: Not on file   Physical Activity: Not on file   Stress: Not on file   Social Connections: Unknown (3/14/2021)    [facility]    Social Connections     Conversations with friends/family/neighbors per week: Not on file   Housing Stability: Low Risk  (7/10/2021)    Received from [facility], [facility]    Housing Stability     Mortgage Payment Concerns?: Not on file     Number of Places Lived in the Last Year: Not on file     Unstable Housing?: Not on file       Allergies:   No Known Allergies    Current Medications:   valACYclovir (VALTREX) 1 G Oral Tab Take 1 tablet by oral route every 12 hours x 2 doses prn for outbreak 30 tablet 1    Cholecalciferol 50 MCG (2000 UT) Oral Cap Take 4,000 Units by mouth daily. 30 capsule 0    Multiple Vitamin (ONE-DAILY MULTI VITAMINS) Oral Tab Take 1 tablet by mouth daily.      Calcium Carbonate-Vitamin D (CALCIUM 500/VITAMIN D OR) Take 1 tablet by mouth daily.         Review of Systems:    Constitutional: Negative for anorexia, chills, fatigue, fevers, night sweats and weight loss.  Eyes: Negative for visual disturbance, irritation and redness.  Respiratory: Negative for cough, hemoptysis, chest pain, or dyspnea on exertion.  Gastrointestinal: Negative for dysphagia, odynophagia, reflux symptoms, nausea, vomiting, change in bowel habits, diarrhea, constipation and abdominal pain.  Integument/breast: Negative for rash, skin lesions, and pruritus.  Hematologic/lymphatic: Negative for easy bruising, bleeding, and lymphadenopathy.  Musculoskeletal: Negative for myalgias, arthralgias, muscle weakness.  Neurological: Negative for headaches, dizziness, speech problems, gait problems and weakness.    A comprehensive 14 point review of systems was completed.  Pertinent positives and negatives noted in the the HPI.     Vital Signs:  /88 (BP Location: Left arm,  Patient Position: Sitting, Cuff Size: adult)   Pulse 65   Temp 97.9 °F (36.6 °C) (Oral)   Resp 16   Ht 1.626 m (5' 4\")   Wt 65.9 kg (145 lb 3.2 oz)   SpO2 95%   BMI 24.92 kg/m²     Physical Examination:    General: Patient is alert and oriented x 3, not in acute distress.  Psych: Friendly, cooperative with appropriate questions and responses  HEENT: EOMs intact. Oropharynx is clear.   Neck: No palpable lymphadenopathy. Neck is supple.  Lymphatics: There is no palpable lymphadenopathy throughout in the cervical, supraclavicular, axillary, or inguinal regions.  Chest: Clear to auscultation. No wheezes or rales.  Heart: Regular rate and rhythm. S1S2 normal.  Abdomen: Soft, non tender with good bowel sounds.  No hepatosplenomegaly.  No palpable mass.  Extremities: No edema or calf tenderness.  Neurological: Grossly intact.      Labs:    Lab Results   Component Value Date/Time    WBC 5.3 05/24/2024 10:24 AM    RBC 4.59 05/24/2024 10:24 AM    HGB 13.0 05/24/2024 10:24 AM    HCT 39.8 05/24/2024 10:24 AM    MCV 86.7 05/24/2024 10:24 AM    MCH 28.3 05/24/2024 10:24 AM    MCHC 32.7 05/24/2024 10:24 AM    RDW 14.9 05/24/2024 10:24 AM    NEPRELIM 2.78 05/24/2024 10:24 AM    .0 05/24/2024 10:24 AM       Lab Results   Component Value Date/Time    GLU 94 05/24/2024 10:24 AM    BUN 23 05/24/2024 10:24 AM    CREATSERUM 0.85 05/24/2024 10:24 AM    GFRNAA 92 10/20/2021 11:42 AM    CA 9.9 05/24/2024 10:24 AM    ALB 4.4 05/24/2024 10:24 AM     05/24/2024 10:24 AM    K 4.8 05/24/2024 10:24 AM     05/24/2024 10:24 AM    CO2 28.0 05/24/2024 10:24 AM    ALKPHO 63 05/24/2024 10:24 AM    AST 19 05/24/2024 10:24 AM    ALT 11 05/24/2024 10:24 AM         Impression:      ICD-10-CM    1. Family history of pulmonary embolism  Z82.49 Anti-Thrombin III        68 year old W with PMH relevant for being heterozygous for factor V Leiden mutation but has never suffered DVT or PE with a family history of a daughter who suffered  PE being evaluated for post operative anticoagulation recommendations prior to upcoming right knee arthroscopy    Plan:    1.) Factor V Leiden mutation--heterozygous, never suffered VTE, but family hx of PE in her daughter    --we reviewed the patient underwent testing 2007.  Unable to assess these results, but she was reported to be heterozygous carrier.  -- Attempting to repeat factor V Leiden testing in this patient would come with significant financial toxicity as her insurance would not cover this testing and the results would not alter management.  We can evaluate for Antithrombin III deficiency  -- Regardless of lab testing, patient should be considered for routine postoperative care including thromboprophylaxis based on the procedure risk associated with her surgery    --Mentions her surgeon understands these risks and is planning her for postoperative anticoagulation.  This patient could be managed with Xarelto 10 mg daily with food or Lovenox 40 mg daily based on whichever option is most cost friendly for her for 30 days post operatively  -- We reviewed the patient should be recommended for early ambulation and regular hydration following orthopedic surgery  -- We also discussed risk for postoperative DVT/PE to occur up to 3 months after surgery.  Therefore, this was educated on signs or symptoms of DVT or PE should be evaluaed immediately in emergency room  --Lida was given written instructions on these recommendations and will follow-up as needed prior to any other surgeries in the future    2.) Current Use of Anticoagulation    --reminded pt that while on the coagulation she is at increased risk for bleeding or bruising, so she should avoid use of ibuprofen or NSAIDs  -- Reviewed patient's recent CBC data showing normal hemoglobin and platelet count appropriate for anticoagulation      MDM: Moderate     Gabriel Orellana MD  [facility]  Mar[address] MCDERMOTT [facility]  [address]  [facility], [place] [address]

## 2024-06-13 LAB — ANTITHROMBIN: 104 %

## 2024-09-12 NOTE — PROGRESS NOTES
Consult visit note faxed over to Dr. RINA Payton's office at [facility] per Dr. Orellana's request. Note faxed to [phone]. Fax success confirmation received.

## 2024-12-16 ENCOUNTER — LAB ENCOUNTER (OUTPATIENT)
Dept: LAB | Age: 69
End: 2024-12-16
Attending: INTERNAL MEDICINE
Payer: MEDICARE

## 2024-12-16 ENCOUNTER — OFFICE VISIT (OUTPATIENT)
Dept: INTERNAL MEDICINE CLINIC | Facility: CLINIC | Age: 69
End: 2024-12-16
Payer: MEDICARE

## 2024-12-16 VITALS
DIASTOLIC BLOOD PRESSURE: 78 MMHG | BODY MASS INDEX: 26.06 KG/M2 | TEMPERATURE: 98 F | WEIGHT: 152.63 LBS | SYSTOLIC BLOOD PRESSURE: 110 MMHG | HEIGHT: 64 IN | HEART RATE: 78 BPM | OXYGEN SATURATION: 97 %

## 2024-12-16 DIAGNOSIS — R73.9 HYPERGLYCEMIA: ICD-10-CM

## 2024-12-16 DIAGNOSIS — D68.51 FACTOR V LEIDEN MUTATION (HCC): ICD-10-CM

## 2024-12-16 DIAGNOSIS — Z12.31 ENCOUNTER FOR SCREENING MAMMOGRAM FOR MALIGNANT NEOPLASM OF BREAST: ICD-10-CM

## 2024-12-16 DIAGNOSIS — Z00.00 ROUTINE HEALTH MAINTENANCE: ICD-10-CM

## 2024-12-16 DIAGNOSIS — M85.89 OSTEOPENIA OF MULTIPLE SITES: Primary | ICD-10-CM

## 2024-12-16 DIAGNOSIS — R73.03 PREDIABETES: ICD-10-CM

## 2024-12-16 DIAGNOSIS — G60.9 CHRONIC IDIOPATHIC AXONAL POLYNEUROPATHY: ICD-10-CM

## 2024-12-16 DIAGNOSIS — E55.9 VITAMIN D DEFICIENCY: ICD-10-CM

## 2024-12-16 LAB
ALBUMIN SERPL-MCNC: 4.7 G/DL (ref 3.2–4.8)
ALBUMIN/GLOB SERPL: 2 {RATIO} (ref 1–2)
ALP LIVER SERPL-CCNC: 66 U/L
ALT SERPL-CCNC: 11 U/L
ANION GAP SERPL CALC-SCNC: 10 MMOL/L (ref 0–18)
AST SERPL-CCNC: 19 U/L (ref ?–34)
BASOPHILS # BLD AUTO: 0.05 X10(3) UL (ref 0–0.2)
BASOPHILS NFR BLD AUTO: 1 %
BILIRUB SERPL-MCNC: 0.6 MG/DL (ref 0.2–1.1)
BUN BLD-MCNC: 18 MG/DL (ref 9–23)
BUN/CREAT SERPL: 21.7 (ref 10–20)
CALCIUM BLD-MCNC: 9.7 MG/DL (ref 8.7–10.4)
CHLORIDE SERPL-SCNC: 109 MMOL/L (ref 98–112)
CHOLEST SERPL-MCNC: 232 MG/DL (ref ?–200)
CO2 SERPL-SCNC: 23 MMOL/L (ref 21–32)
CREAT BLD-MCNC: 0.83 MG/DL
DEPRECATED RDW RBC AUTO: 45.3 FL (ref 35.1–46.3)
EGFRCR SERPLBLD CKD-EPI 2021: 77 ML/MIN/1.73M2 (ref 60–?)
EOSINOPHIL # BLD AUTO: 0.31 X10(3) UL (ref 0–0.7)
EOSINOPHIL NFR BLD AUTO: 6.4 %
ERYTHROCYTE [DISTWIDTH] IN BLOOD BY AUTOMATED COUNT: 13.8 % (ref 11–15)
EST. AVERAGE GLUCOSE BLD GHB EST-MCNC: 117 MG/DL (ref 68–126)
FASTING PATIENT LIPID ANSWER: YES
FASTING STATUS PATIENT QL REPORTED: YES
GLOBULIN PLAS-MCNC: 2.3 G/DL (ref 2–3.5)
GLUCOSE BLD-MCNC: 105 MG/DL (ref 70–99)
HBA1C MFR BLD: 5.7 % (ref ?–5.7)
HCT VFR BLD AUTO: 38.6 %
HDLC SERPL-MCNC: 76 MG/DL (ref 40–59)
HGB BLD-MCNC: 12.9 G/DL
IMM GRANULOCYTES # BLD AUTO: 0.02 X10(3) UL (ref 0–1)
IMM GRANULOCYTES NFR BLD: 0.4 %
LDLC SERPL CALC-MCNC: 144 MG/DL (ref ?–100)
LYMPHOCYTES # BLD AUTO: 1.41 X10(3) UL (ref 1–4)
LYMPHOCYTES NFR BLD AUTO: 29.1 %
MCH RBC QN AUTO: 29.9 PG (ref 26–34)
MCHC RBC AUTO-ENTMCNC: 33.4 G/DL (ref 31–37)
MCV RBC AUTO: 89.6 FL
MONOCYTES # BLD AUTO: 0.58 X10(3) UL (ref 0.1–1)
MONOCYTES NFR BLD AUTO: 12 %
NEUTROPHILS # BLD AUTO: 2.47 X10 (3) UL (ref 1.5–7.7)
NEUTROPHILS # BLD AUTO: 2.47 X10(3) UL (ref 1.5–7.7)
NEUTROPHILS NFR BLD AUTO: 51.1 %
NONHDLC SERPL-MCNC: 156 MG/DL (ref ?–130)
OSMOLALITY SERPL CALC.SUM OF ELEC: 296 MOSM/KG (ref 275–295)
PLATELET # BLD AUTO: 252 10(3)UL (ref 150–450)
POTASSIUM SERPL-SCNC: 4.3 MMOL/L (ref 3.5–5.1)
PROT SERPL-MCNC: 7 G/DL (ref 5.7–8.2)
RBC # BLD AUTO: 4.31 X10(6)UL
SODIUM SERPL-SCNC: 142 MMOL/L (ref 136–145)
TRIGL SERPL-MCNC: 72 MG/DL (ref 30–149)
TSI SER-ACNC: 2.31 UIU/ML (ref 0.55–4.78)
VIT D+METAB SERPL-MCNC: 44.6 NG/ML (ref 30–100)
VLDLC SERPL CALC-MCNC: 13 MG/DL (ref 0–30)
WBC # BLD AUTO: 4.8 X10(3) UL (ref 4–11)

## 2024-12-16 PROCEDURE — 80061 LIPID PANEL: CPT | Performed by: INTERNAL MEDICINE

## 2024-12-16 PROCEDURE — 83036 HEMOGLOBIN GLYCOSYLATED A1C: CPT | Performed by: INTERNAL MEDICINE

## 2024-12-16 PROCEDURE — 84443 ASSAY THYROID STIM HORMONE: CPT | Performed by: INTERNAL MEDICINE

## 2024-12-16 PROCEDURE — 85025 COMPLETE CBC W/AUTO DIFF WBC: CPT | Performed by: INTERNAL MEDICINE

## 2024-12-16 PROCEDURE — 80053 COMPREHEN METABOLIC PANEL: CPT | Performed by: INTERNAL MEDICINE

## 2024-12-16 PROCEDURE — 36415 COLL VENOUS BLD VENIPUNCTURE: CPT | Performed by: INTERNAL MEDICINE

## 2024-12-16 PROCEDURE — 82306 VITAMIN D 25 HYDROXY: CPT

## 2024-12-16 NOTE — PROGRESS NOTES
Minna Mckeon is a 68 year old female.  Chief Complaint   Patient presents with    Physical       HPI:   Minna Mckeon is a 68 year old female who presents for a complete physical exam.     Feels well.    Had R meniscal tear repair in 2024 (Dr. Chandra Payton) -- retore it in 2024; just received #1/3 PRP injections last week; #2 injection this week.  If not improved in the next 6 months, will eventually need TKR.  Has not been able to exercise as much as a result.  Doing non-resistance biking.    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       Current Outpatient Medications   Medication Sig Dispense Refill    valACYclovir (VALTREX) 1 G Oral Tab Take 1 tablet by oral route every 12 hours x 2 doses prn for outbreak 30 tablet 1    Cholecalciferol 50 MCG (2000 UT) Oral Cap Take 4,000 Units by mouth daily. 30 capsule 0    Multiple Vitamin (ONE-DAILY MULTI VITAMINS) Oral Tab Take 1 tablet by mouth daily.      Calcium Carbonate-Vitamin D (CALCIUM 500/VITAMIN D OR) Take 1 tablet by mouth daily.        Past Medical History:    Allergic rhinitis    Heterozygous factor V Leiden mutation (HCC)    no hx of DVT/PE    High cholesterol    Osteopenia      Past Surgical History:   Procedure Laterality Date    Arthrotomy,open repair meniscus Right 2024      1985    second procedure in     Colonoscopy  2023    Other surgical history  2017    Gum surgery    Other surgical history  2017    Vein Surgery      Family History   Problem Relation Age of Onset    Anemia Mother         Pernicious anemia    Cancer Mother         Monoclonal gammopathy    Hypertension Father     Genetic Disease Father         ulcers,gallbladder removed    Dementia  Father     Heart Disorder Father     Stroke Father     DVT/VTE Daughter 23        Factor V Leiden, OCP induced    Breast Cancer Neg     Ovarian Cancer Neg     Bleeding Disorders Neg       Social History:   Social History     Socioeconomic History    Marital status:    Tobacco Use    Smoking status: Former     Current packs/day: 0.00     Average packs/day: 0.1 packs/day for 3.0 years (0.3 ttl pk-yrs)     Types: Cigarettes     Start date: 6/15/1982     Quit date: 6/15/1985     Years since quittin.5    Smokeless tobacco: Former   Vaping Use    Vaping status: Never Used   Substance and Sexual Activity    Alcohol use: Yes     Alcohol/week: 2.0 standard drinks of alcohol     Types: 1 Glasses of wine, 1 Cans of beer per week     Comment: Occasional    Drug use: No   Other Topics Concern    Caffeine Concern Yes     Comment: Coffee 2 cups daily; Tea   Social History Narrative    Lida is  to Jos. She has 2 adult children. Patient is a retired nurse. She lives with her  in [place], [place]     Social Drivers of Health      [facility], [facility]    Social Connections    [facility] from [facility], [facility]    Housing Stability          REVIEW OF SYSTEMS:   GENERAL: feels well otherwise  SKIN: denies any unusual skin lesions  EYES:denies blurred vision or double vision  HEENT: denies nasal congestion, sinus pain or ST  LUNGS: denies shortness of breath with exertion or cough  CARDIOVASCULAR: denies chest pain, pressure, or palpitations  GI: denies abdominal pain, nausea, vomiting, diarrhea, constipation, hematochezia, or melena  NEURO: denies headaches or dizziness    EXAM:   /78   Pulse 78   Temp 97.5 °F (36.4 °C)   Ht 5' 4\" (1.626 m)   Wt 152 lb 9.6 oz (69.2 kg)   SpO2 97%   BMI 26.19 kg/m²     GENERAL: well developed, well nourished, in no apparent distress  HEENT: normal oropharynx, normal  TM's  EYES: PERRLA, EOMI, conjunctivae are pink  NECK: supple, no cervical or supraclavicular LAD, no carotid bruits,  Mild tend R trap m  BREAST: no dominant or suspicious mass, no axillary LAD  LUNGS: clear to auscultation  CARDIO: RRR, normal S1S2, no gallops or murmurs  GI: soft, NT, ND, NABS, no HSM  EXTREMITIES: no cyanosis, clubbing or edema, +2 DP pulses        ASSESSMENT AND PLAN:     Osteopenia, mult sites  Last DEXA 12/2023 - stable; FRAX okay  Cont exercise.      Routine health maintenance  Mammo normal 12/14/23; reordered   Pap/HPV normal 10/28/22 -- no further Paps needed  Colonoscopy done in 9/2010 and again 11/11/22 -- no polyps; next in 11/2032  Had Zostavax in 2014; had Shingrix x 2 in 2022  Tdap 4/3/19  PCV 20 10/13/22  Check labs     Hyperglycemia  HgbA1c 5.6 in 5/2024    Hx of Factor V Leiden mutation  Daughter w/PE then tested positive, so pt subsequently tested +    No personal hx of clots    Polyneuropathy  Numbness, paresthesias BLE  -Vit B12 level normal; HgbA1c normal (5.5 in 2019)  -saw neuro Dr. Lewis in 2019; thought to have a polyneuropathy of unclear etiology.    -Lyme, syphyllis screen negative.  Paraneoplastic panel neg. SPEP normal.   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  -saw Dr. Palak Almaraz (neuro at [facility]); MRI cervical spine with stenosis at C5-6 but no signal change on spinal cord; pt opted not to pursue that.  -HIV neg, ESR/CRP/Vit B6 normal in 10/2019; RF slightly elevated but normal CCP Ab.  ELGIN, SSA/B negative in 10/2019    Hx R knee meniscal tear  -s/p repair in 6/2024 by Dr. Chandra Payton  -reinjured in 9/2024  -now getting PRP injections (weekly x 3 doses)  -may eventually need TKR    Bilateral impacted cerumen  -R removed with curette  -L removed with flushing/curette     RTC 1 yr.

## 2025-01-06 ENCOUNTER — HOSPITAL ENCOUNTER (OUTPATIENT)
Dept: MAMMOGRAPHY | Age: 70
Discharge: HOME | End: 2025-01-06
Attending: INTERNAL MEDICINE
Payer: MEDICARE

## 2025-01-06 DIAGNOSIS — Z12.31 ENCOUNTER FOR SCREENING MAMMOGRAM FOR MALIGNANT NEOPLASM OF BREAST: ICD-10-CM

## 2025-01-06 PROCEDURE — 77067 SCR MAMMO BI INCL CAD: CPT | Performed by: INTERNAL MEDICINE

## 2025-01-06 PROCEDURE — 77063 BREAST TOMOSYNTHESIS BI: CPT | Performed by: INTERNAL MEDICINE

## 2025-03-14 RX ORDER — VALACYCLOVIR HYDROCHLORIDE 1 G/1
TABLET, FILM COATED ORAL
Qty: 30 TABLET | Refills: 0 | Status: SHIPPED | OUTPATIENT
Start: 2025-03-14

## 2025-03-14 NOTE — TELEPHONE ENCOUNTER
Refill request is for a maintenance medication and has met the criteria specified in the Ambulatory Medication Refill Standing Order for eligibility, visits, laboratory, alerts and was sent to the requested pharmacy.    Requested Prescriptions     Signed Prescriptions Disp Refills    valACYclovir 1 G Oral Tab 30 tablet 0     Sig: Take 1 tablet by mouth every 12 hours as needed for outbreak     Authorizing Provider: JUAN BEARDEN     Ordering User: MOODY ESPARZA

## 2025-06-04 ENCOUNTER — HOSPITAL ENCOUNTER (OUTPATIENT)
Age: 70
Discharge: HOME | End: 2025-06-04
Attending: EMERGENCY MEDICINE
Payer: MEDICARE

## 2025-06-04 VITALS
SYSTOLIC BLOOD PRESSURE: 146 MMHG | TEMPERATURE: 98 F | RESPIRATION RATE: 16 BRPM | DIASTOLIC BLOOD PRESSURE: 84 MMHG | OXYGEN SATURATION: 100 % | HEART RATE: 66 BPM

## 2025-06-04 DIAGNOSIS — H00.022 HORDEOLUM INTERNUM OF RIGHT LOWER EYELID: Primary | ICD-10-CM

## 2025-06-04 PROCEDURE — 99213 OFFICE O/P EST LOW 20 MIN: CPT

## 2025-06-04 RX ORDER — OFLOXACIN 3 MG/ML
2 SOLUTION/ DROPS OPHTHALMIC 4 TIMES DAILY
Qty: 5 ML | Refills: 0 | Status: SHIPPED | OUTPATIENT
Start: 2025-06-04

## 2025-06-04 NOTE — ED PROVIDER NOTES
Patient Seen in: [facility]        History  Chief Complaint   Patient presents with    Eye Problem     Stated Complaint: Right Problem    Subjective:   HPI    Patient is a 69-year-old female with no significant past medical history who presents now with redness, swelling of the right lower eyelid.  The patient states she believes this began over the weekend, approximately on Saturday.  Patient has been doing warm compresses at home, but states that the area seems to be becoming more swollen.  Patient denies any fever.  The patient denies any pain to the eye itself or visual changes      Objective:     No pertinent past medical history.            No pertinent past surgical history.              No pertinent social history.            Review of Systems    Positive for stated complaint: Right Problem  Other systems are as noted in HPI.  Constitutional and vital signs reviewed.      All other systems reviewed and negative except as noted above.                  Physical Exam    ED Triage Vitals [06/04/25 1538]   /84   Pulse 66   Resp 16   Temp 97.9 °F (36.6 °C)   Temp src Oral   SpO2 100 %   O2 Device None (Room air)       Current Vitals:   Vital Signs  BP: 146/84  Pulse: 66  Resp: 16  Temp: 97.9 °F (36.6 °C)  Temp src: Oral    Oxygen Therapy  SpO2: 100 %  O2 Device: None (Room air)            Physical Exam    Constitutional: Well-developed well-nourished in no acute distress  Head: Normocephalic, no swelling or tenderness  Eyes: Nonicteric sclera, mild right conjunctival injection.  There are multiple small pustules along the internal surface of the right lower eyelid consistent with styes.  There is no significant swelling of the periorbital area  ENT: TMs are clear and flat bilaterally.  There is no posterior pharyngeal erythema  Chest: Clear to auscultation, no tenderness  Cardiovascular: Regular rate and rhythm without murmur  Abdomen: Soft, nontender and nondistended  Neurologic: Patient is  awake, alert and oriented ×3.  The patient's motor strength is 5 out of 5 and symmetric in the upper and lower extremities bilaterally  Extremities: No focal swelling or tenderness  Skin: No pallor, no redness or warmth to the touch          ED Course  Labs Reviewed - No data to display       Will initiate ofloxacin eyedrops.  Recommend continuing warm compresses.  Will provide with ophthalmologic follow-up                  MDM     Conjunctivitis versus stye        Medical Decision Making      Disposition and Plan     Clinical Impression:  1. Hordeolum internum of right lower eyelid         Disposition:  Discharge  6/4/2025  4:08 pm    Follow-up:  Olman Michel MD  [address] [place] [address]  [phone]      If symptoms worsen          Medications Prescribed:  Current Discharge Medication List        START taking these medications    Details   ofloxacin 0.3 % Ophthalmic Solution Place 2 drops into the right eye 4 (four) times daily.  Qty: 5 mL, Refills: 0                   Supplementary Documentation:

## 2025-06-04 NOTE — DISCHARGE INSTRUCTIONS
Ofloxacin as prescribed.  Continue warm compresses.  Follow-up with ophthalmology for any worsening symptoms

## 2025-06-04 NOTE — ED INITIAL ASSESSMENT (HPI)
Patient with right lower lid bumps  She has been using warm compress without relief   No vision changes  Wears contacts, not wearing contacts today

## 2025-08-06 ENCOUNTER — TELEPHONE (OUTPATIENT)
Dept: INTERNAL MEDICINE CLINIC | Facility: CLINIC | Age: 70
End: 2025-08-06